# Patient Record
Sex: MALE | Race: WHITE | NOT HISPANIC OR LATINO | Employment: FULL TIME | URBAN - NONMETROPOLITAN AREA
[De-identification: names, ages, dates, MRNs, and addresses within clinical notes are randomized per-mention and may not be internally consistent; named-entity substitution may affect disease eponyms.]

---

## 2018-04-20 ENCOUNTER — OFFICE VISIT (OUTPATIENT)
Dept: INTERNAL MEDICINE | Facility: CLINIC | Age: 43
End: 2018-04-20

## 2018-04-20 VITALS
OXYGEN SATURATION: 98 % | HEART RATE: 74 BPM | BODY MASS INDEX: 30.09 KG/M2 | SYSTOLIC BLOOD PRESSURE: 130 MMHG | TEMPERATURE: 98.6 F | WEIGHT: 227 LBS | HEIGHT: 73 IN | DIASTOLIC BLOOD PRESSURE: 88 MMHG

## 2018-04-20 DIAGNOSIS — J32.8 OTHER CHRONIC SINUSITIS: ICD-10-CM

## 2018-04-20 DIAGNOSIS — F39 MOOD DISORDER (HCC): Primary | ICD-10-CM

## 2018-04-20 DIAGNOSIS — Z00.00 HEALTHCARE MAINTENANCE: ICD-10-CM

## 2018-04-20 DIAGNOSIS — R53.83 OTHER FATIGUE: ICD-10-CM

## 2018-04-20 DIAGNOSIS — E78.2 MIXED HYPERLIPIDEMIA: ICD-10-CM

## 2018-04-20 PROCEDURE — 99386 PREV VISIT NEW AGE 40-64: CPT | Performed by: FAMILY MEDICINE

## 2018-04-20 RX ORDER — MOMETASONE FUROATE 50 UG/1
2 SPRAY, METERED NASAL DAILY
COMMUNITY
End: 2019-06-03 | Stop reason: ALTCHOICE

## 2018-04-20 RX ORDER — IPRATROPIUM BROMIDE 21 UG/1
2 SPRAY, METERED NASAL EVERY 12 HOURS
Qty: 30 ML | Refills: 12 | Status: SHIPPED | OUTPATIENT
Start: 2018-04-20 | End: 2019-06-03 | Stop reason: ALTCHOICE

## 2018-04-20 RX ORDER — DULOXETIN HYDROCHLORIDE 60 MG/1
CAPSULE, DELAYED RELEASE ORAL
COMMUNITY
Start: 2018-03-16 | End: 2018-04-20 | Stop reason: SDUPTHER

## 2018-04-20 RX ORDER — DULOXETIN HYDROCHLORIDE 60 MG/1
60 CAPSULE, DELAYED RELEASE ORAL DAILY
Qty: 90 CAPSULE | Refills: 1 | Status: SHIPPED | OUTPATIENT
Start: 2018-04-20 | End: 2019-06-03 | Stop reason: SDUPTHER

## 2018-04-24 LAB
25(OH)D3+25(OH)D2 SERPL-MCNC: <12.8 NG/ML
ALBUMIN SERPL-MCNC: 4.2 G/DL (ref 3.5–5)
ALBUMIN/GLOB SERPL: 1.4 G/DL (ref 1–2)
ALP SERPL-CCNC: 105 U/L (ref 38–126)
ALT SERPL-CCNC: 23 U/L (ref 13–69)
AST SERPL-CCNC: 22 U/L (ref 15–46)
BILIRUB SERPL-MCNC: 1.2 MG/DL (ref 0.2–1.3)
BUN SERPL-MCNC: 14 MG/DL (ref 7–20)
BUN/CREAT SERPL: 15.6 (ref 6.3–21.9)
CALCIUM SERPL-MCNC: 9.5 MG/DL (ref 8.4–10.2)
CHLORIDE SERPL-SCNC: 103 MMOL/L (ref 98–107)
CHOLEST SERPL-MCNC: 248 MG/DL (ref 0–199)
CO2 SERPL-SCNC: 29 MMOL/L (ref 26–30)
CREAT SERPL-MCNC: 0.9 MG/DL (ref 0.6–1.3)
CRP SERPL HS-MCNC: 2.39 MG/L (ref 0–3)
ERYTHROCYTE [DISTWIDTH] IN BLOOD BY AUTOMATED COUNT: 12.4 % (ref 11.5–14.5)
FERRITIN SERPL-MCNC: 72.7 NG/ML (ref 17.9–464)
GFR SERPLBLD CREATININE-BSD FMLA CKD-EPI: 112 ML/MIN/1.73
GFR SERPLBLD CREATININE-BSD FMLA CKD-EPI: 93 ML/MIN/1.73
GLOBULIN SER CALC-MCNC: 3 GM/DL
GLUCOSE SERPL-MCNC: 104 MG/DL (ref 74–98)
HBA1C MFR BLD: 5.4 %
HCT VFR BLD AUTO: 44.5 % (ref 42–52)
HDLC SERPL-MCNC: 45 MG/DL (ref 40–60)
HGB BLD-MCNC: 15 G/DL (ref 14–18)
IRON SATN MFR SERPL: 47 % (ref 11–46)
IRON SERPL-MCNC: 167 MCG/DL (ref 37–181)
LDLC SERPL CALC-MCNC: 187 MG/DL (ref 0–99)
MCH RBC QN AUTO: 31 PG (ref 27–31)
MCHC RBC AUTO-ENTMCNC: 33.7 G/DL (ref 30–37)
MCV RBC AUTO: 91.9 FL (ref 80–94)
PLATELET # BLD AUTO: 232 10*3/MM3 (ref 130–400)
POTASSIUM SERPL-SCNC: 4.6 MMOL/L (ref 3.5–5.1)
PROT SERPL-MCNC: 7.2 G/DL (ref 6.3–8.2)
RBC # BLD AUTO: 4.84 10*6/MM3 (ref 4.7–6.1)
SODIUM SERPL-SCNC: 144 MMOL/L (ref 137–145)
T4 SERPL-MCNC: 5.6 UG/DL (ref 4.5–12)
TIBC SERPL-MCNC: 358 MCG/DL (ref 261–497)
TRIGL SERPL-MCNC: 80 MG/DL
TSH SERPL DL<=0.005 MIU/L-ACNC: 0.89 MIU/ML (ref 0.47–4.68)
UIBC SERPL-MCNC: 191 MCG/DL
VIT B12 SERPL-MCNC: 267 PG/ML (ref 239–931)
VLDLC SERPL CALC-MCNC: 16 MG/DL
WBC # BLD AUTO: 4.7 10*3/MM3 (ref 4.8–10.8)

## 2018-04-25 ENCOUNTER — APPOINTMENT (OUTPATIENT)
Dept: GENERAL RADIOLOGY | Facility: HOSPITAL | Age: 43
End: 2018-04-25

## 2018-04-25 ENCOUNTER — HOSPITAL ENCOUNTER (EMERGENCY)
Facility: HOSPITAL | Age: 43
Discharge: HOME | End: 2018-04-25
Attending: EMERGENCY MEDICINE | Admitting: EMERGENCY MEDICINE

## 2018-04-25 VITALS
HEART RATE: 62 BPM | RESPIRATION RATE: 15 BRPM | BODY MASS INDEX: 27.83 KG/M2 | TEMPERATURE: 98.6 F | OXYGEN SATURATION: 98 % | DIASTOLIC BLOOD PRESSURE: 99 MMHG | WEIGHT: 210 LBS | HEIGHT: 73 IN | SYSTOLIC BLOOD PRESSURE: 145 MMHG

## 2018-04-25 DIAGNOSIS — R07.9 CHEST PAIN, UNSPECIFIED TYPE: Primary | ICD-10-CM

## 2018-04-25 LAB
ALBUMIN SERPL-MCNC: 4.5 G/DL (ref 3.2–4.8)
ALBUMIN/GLOB SERPL: 1.8 G/DL (ref 1.5–2.5)
ALP SERPL-CCNC: 110 U/L (ref 25–100)
ALT SERPL W P-5'-P-CCNC: 17 U/L (ref 7–40)
ANION GAP SERPL CALCULATED.3IONS-SCNC: 7 MMOL/L (ref 3–11)
AST SERPL-CCNC: 22 U/L (ref 0–33)
BASOPHILS # BLD AUTO: 0.02 10*3/MM3 (ref 0–0.2)
BASOPHILS NFR BLD AUTO: 0.3 % (ref 0–1)
BILIRUB SERPL-MCNC: 0.6 MG/DL (ref 0.3–1.2)
BUN BLD-MCNC: 13 MG/DL (ref 9–23)
BUN/CREAT SERPL: 10.8 (ref 7–25)
CALCIUM SPEC-SCNC: 9.6 MG/DL (ref 8.7–10.4)
CHLORIDE SERPL-SCNC: 102 MMOL/L (ref 99–109)
CO2 SERPL-SCNC: 29 MMOL/L (ref 20–31)
CREAT BLD-MCNC: 1.2 MG/DL (ref 0.6–1.3)
DEPRECATED RDW RBC AUTO: 40.3 FL (ref 37–54)
EOSINOPHIL # BLD AUTO: 0.2 10*3/MM3 (ref 0–0.3)
EOSINOPHIL NFR BLD AUTO: 3.2 % (ref 0–3)
ERYTHROCYTE [DISTWIDTH] IN BLOOD BY AUTOMATED COUNT: 12.3 % (ref 11.3–14.5)
GFR SERPL CREATININE-BSD FRML MDRD: 66 ML/MIN/1.73
GLOBULIN UR ELPH-MCNC: 2.5 GM/DL
GLUCOSE BLD-MCNC: 87 MG/DL (ref 70–100)
HCT VFR BLD AUTO: 44.2 % (ref 38.9–50.9)
HGB BLD-MCNC: 15.2 G/DL (ref 13.1–17.5)
IMM GRANULOCYTES # BLD: 0.01 10*3/MM3 (ref 0–0.03)
IMM GRANULOCYTES NFR BLD: 0.2 % (ref 0–0.6)
LIPASE SERPL-CCNC: 32 U/L (ref 6–51)
LYMPHOCYTES # BLD AUTO: 1.79 10*3/MM3 (ref 0.6–4.8)
LYMPHOCYTES NFR BLD AUTO: 28.9 % (ref 24–44)
MCH RBC QN AUTO: 30.8 PG (ref 27–31)
MCHC RBC AUTO-ENTMCNC: 34.4 G/DL (ref 32–36)
MCV RBC AUTO: 89.5 FL (ref 80–99)
MONOCYTES # BLD AUTO: 0.58 10*3/MM3 (ref 0–1)
MONOCYTES NFR BLD AUTO: 9.4 % (ref 0–12)
NEUTROPHILS # BLD AUTO: 3.6 10*3/MM3 (ref 1.5–8.3)
NEUTROPHILS NFR BLD AUTO: 58 % (ref 41–71)
PLATELET # BLD AUTO: 230 10*3/MM3 (ref 150–450)
PMV BLD AUTO: 8.6 FL (ref 6–12)
POTASSIUM BLD-SCNC: 4.4 MMOL/L (ref 3.5–5.5)
PROT SERPL-MCNC: 7 G/DL (ref 5.7–8.2)
RBC # BLD AUTO: 4.94 10*6/MM3 (ref 4.2–5.76)
SODIUM BLD-SCNC: 138 MMOL/L (ref 132–146)
TESTOST FREE SERPL-MCNC: 8 PG/ML (ref 6.8–21.5)
TESTOST SERPL-MCNC: 457 NG/DL (ref 264–916)
TROPONIN I SERPL-MCNC: 0 NG/ML (ref 0–0.07)
TROPONIN I SERPL-MCNC: 0 NG/ML (ref 0–0.07)
WBC NRBC COR # BLD: 6.2 10*3/MM3 (ref 3.5–10.8)

## 2018-04-25 PROCEDURE — 93005 ELECTROCARDIOGRAM TRACING: CPT | Performed by: EMERGENCY MEDICINE

## 2018-04-25 PROCEDURE — 84484 ASSAY OF TROPONIN QUANT: CPT

## 2018-04-25 PROCEDURE — 80053 COMPREHEN METABOLIC PANEL: CPT | Performed by: EMERGENCY MEDICINE

## 2018-04-25 PROCEDURE — 71046 X-RAY EXAM CHEST 2 VIEWS: CPT

## 2018-04-25 PROCEDURE — 93005 ELECTROCARDIOGRAM TRACING: CPT

## 2018-04-25 PROCEDURE — 83690 ASSAY OF LIPASE: CPT | Performed by: EMERGENCY MEDICINE

## 2018-04-25 PROCEDURE — 85025 COMPLETE CBC W/AUTO DIFF WBC: CPT | Performed by: EMERGENCY MEDICINE

## 2018-04-25 PROCEDURE — 99284 EMERGENCY DEPT VISIT MOD MDM: CPT

## 2018-04-25 RX ORDER — ASPIRIN 325 MG
325 TABLET ORAL ONCE
Status: DISCONTINUED | OUTPATIENT
Start: 2018-04-25 | End: 2018-04-25

## 2019-06-03 ENCOUNTER — OFFICE VISIT (OUTPATIENT)
Dept: INTERNAL MEDICINE | Facility: CLINIC | Age: 44
End: 2019-06-03

## 2019-06-03 VITALS
HEIGHT: 73 IN | HEART RATE: 72 BPM | SYSTOLIC BLOOD PRESSURE: 122 MMHG | BODY MASS INDEX: 30.75 KG/M2 | OXYGEN SATURATION: 95 % | WEIGHT: 232 LBS | TEMPERATURE: 98 F | DIASTOLIC BLOOD PRESSURE: 90 MMHG

## 2019-06-03 DIAGNOSIS — M25.512 CHRONIC LEFT SHOULDER PAIN: Primary | ICD-10-CM

## 2019-06-03 DIAGNOSIS — M25.50 ARTHRALGIA, UNSPECIFIED JOINT: ICD-10-CM

## 2019-06-03 DIAGNOSIS — G89.29 CHRONIC LEFT SHOULDER PAIN: Primary | ICD-10-CM

## 2019-06-03 DIAGNOSIS — K21.9 GASTROESOPHAGEAL REFLUX DISEASE, ESOPHAGITIS PRESENCE NOT SPECIFIED: ICD-10-CM

## 2019-06-03 DIAGNOSIS — E78.00 PURE HYPERCHOLESTEROLEMIA: ICD-10-CM

## 2019-06-03 PROCEDURE — 99214 OFFICE O/P EST MOD 30 MIN: CPT | Performed by: FAMILY MEDICINE

## 2019-06-03 RX ORDER — DULOXETIN HYDROCHLORIDE 60 MG/1
60 CAPSULE, DELAYED RELEASE ORAL DAILY
Qty: 90 CAPSULE | Refills: 3 | Status: SHIPPED | OUTPATIENT
Start: 2019-06-03 | End: 2020-01-28 | Stop reason: SDUPTHER

## 2019-06-03 RX ORDER — OMEPRAZOLE 20 MG/1
20 CAPSULE, DELAYED RELEASE ORAL DAILY
Qty: 90 CAPSULE | Refills: 3 | Status: SHIPPED | OUTPATIENT
Start: 2019-06-03 | End: 2020-02-28 | Stop reason: SDUPTHER

## 2019-06-03 RX ORDER — CELECOXIB 100 MG/1
100 CAPSULE ORAL 2 TIMES DAILY PRN
Qty: 60 CAPSULE | Refills: 2 | Status: SHIPPED | OUTPATIENT
Start: 2019-06-03 | End: 2020-02-28

## 2019-06-03 RX ORDER — FLUTICASONE PROPIONATE 50 MCG
2 SPRAY, SUSPENSION (ML) NASAL DAILY
COMMUNITY
End: 2021-08-19 | Stop reason: SINTOL

## 2019-06-03 RX ORDER — ATORVASTATIN CALCIUM 20 MG/1
20 TABLET, FILM COATED ORAL DAILY
COMMUNITY
End: 2020-02-28

## 2019-06-03 RX ORDER — CETIRIZINE HYDROCHLORIDE, PSEUDOEPHEDRINE HYDROCHLORIDE 5; 120 MG/1; MG/1
1 TABLET, FILM COATED, EXTENDED RELEASE ORAL 2 TIMES DAILY
COMMUNITY
End: 2021-02-26

## 2019-06-03 NOTE — PROGRESS NOTES
Satish Funes is a 43 y.o. male.    Chief Complaint   Patient presents with   • Shoulder Pain     left shoulder x 6 months   • Hyperlipidemia   • Med Refill       HPI   Patient has had hyperlipidemia for few years. He has not been compliant with low fat diet. He has not been compliant with taking the medications, with side effects. his weight is stable compared to last visit. He is active.    Patient reports he has been taking cymbalta for body pain.  He states that the medication does help with his generalized body aches.  He is an active farmer and .  This tends to cause a lot of aches and pains.  He states that he is not taking the medication for anxiety or depression.    Patient has been complaining of shoulder - left pain for 6 months. Pain is a 4 on a scale of 0-10. Pain is ache. Pain radiates to arm - left. Pain is worse with lifting, better with nothing. Symptoms are worse.  Patient has tried nothing with no relief.  He has tried stretches with minimal relief.      Patient does admit to heartburn or reflux.  He is not currently taking anything for this issue.  He denies any nausea, vomiting, diarrhea, abdominal pain.    The following portions of the patient's history were reviewed and updated as appropriate: allergies, current medications, past family history, past medical history, past social history, past surgical history and problem list.     No Known Allergies      Current Outpatient Medications:   •  atorvastatin (LIPITOR) 20 MG tablet, Take 20 mg by mouth Daily., Disp: , Rfl:   •  cetirizine-pseudoephedrine (ZyrTEC-D) 5-120 MG per 12 hr tablet, Take 1 tablet by mouth 2 (Two) Times a Day., Disp: , Rfl:   •  DULoxetine (CYMBALTA) 60 MG capsule, Take 1 capsule by mouth Daily., Disp: 90 capsule, Rfl: 3  •  fluticasone (FLONASE) 50 MCG/ACT nasal spray, 2 sprays into the nostril(s) as directed by provider Daily., Disp: , Rfl:   •  celecoxib (CeleBREX) 100 MG capsule, Take 1 capsule by mouth 2 (Two)  "Times a Day As Needed for Moderate Pain ., Disp: 60 capsule, Rfl: 2  •  omeprazole (priLOSEC) 20 MG capsule, Take 1 capsule by mouth Daily., Disp: 90 capsule, Rfl: 3    ROS    Review of Systems   Constitutional: Negative for chills, fatigue and fever.   HENT: Positive for congestion and rhinorrhea. Negative for postnasal drip and sore throat.    Eyes: Negative for blurred vision and visual disturbance.   Respiratory: Negative for cough, shortness of breath and wheezing.    Cardiovascular: Negative for chest pain and leg swelling.   Gastrointestinal: Positive for GERD. Negative for abdominal pain, constipation, diarrhea, nausea and vomiting.   Musculoskeletal: Negative for arthralgias and back pain.   Skin: Negative for color change and rash.   Allergic/Immunologic: Negative for environmental allergies.   Neurological: Negative for weakness, numbness and headache.   Hematological: Does not bruise/bleed easily.   Psychiatric/Behavioral: Negative for depressed mood. The patient is not nervous/anxious.        Vitals:    06/03/19 1545   BP: 122/90   BP Location: Left arm   Patient Position: Sitting   Cuff Size: Adult   Pulse: 72   Temp: 98 °F (36.7 °C)   TempSrc: Oral   SpO2: 95%   Weight: 105 kg (232 lb)   Height: 185.4 cm (73\")     Body mass index is 30.61 kg/m².    Physical Exam     Physical Exam   Constitutional: He is oriented to person, place, and time. He appears well-developed and well-nourished. No distress.   HENT:   Head: Normocephalic and atraumatic.   Right Ear: Tympanic membrane and external ear normal.   Left Ear: Tympanic membrane and external ear normal.   Mouth/Throat: Oropharynx is clear and moist.   Eyes: Conjunctivae and EOM are normal. Pupils are equal, round, and reactive to light.   Neck: Normal range of motion. Neck supple.   Cardiovascular: Normal rate and regular rhythm.   No murmur heard.  Pulmonary/Chest: Effort normal and breath sounds normal. No respiratory distress. He has no wheezes. "   Abdominal: Soft. Bowel sounds are normal. He exhibits no distension. There is no tenderness.   Musculoskeletal: He exhibits no edema.        Left shoulder: He exhibits decreased range of motion and pain. He exhibits no tenderness and no swelling.   Lymphadenopathy:     He has no cervical adenopathy.   Neurological: He is alert and oriented to person, place, and time. No cranial nerve deficit.   Skin: Skin is warm and dry.   Psychiatric: He has a normal mood and affect.       Assessment/Plan    Problem List Items Addressed This Visit        Cardiovascular and Mediastinum    Hyperlipidemia     Likely uncontrolled due to noncompliance with medication.  Will obtain lipid level today.  If remains uncontrolled, patient has been highly encouraged to take a statin.  She is also been encouraged to follow a low-fat diet.         Relevant Medications    atorvastatin (LIPITOR) 20 MG tablet    Other Relevant Orders    CBC & Differential    Comprehensive Metabolic Panel    Lipid Panel       Digestive    Gastroesophageal reflux disease     Uncontrolled.  Will start the patient on Prilosec.         Relevant Medications    omeprazole (priLOSEC) 20 MG capsule       Nervous and Auditory    Chronic left shoulder pain - Primary     Likely arthritis.  Patient was previously taking Celebrex for arthritic type pains.  He would like to try this medication again.  He has been encouraged to stretch and exercise the shoulder as well.         Relevant Orders    XR Shoulder 2+ View Left    Joint pain     Improved with Cymbalta.  Patient may continue taking the medication.               New Medications Ordered This Visit   Medications   • celecoxib (CeleBREX) 100 MG capsule     Sig: Take 1 capsule by mouth 2 (Two) Times a Day As Needed for Moderate Pain .     Dispense:  60 capsule     Refill:  2   • DULoxetine (CYMBALTA) 60 MG capsule     Sig: Take 1 capsule by mouth Daily.     Dispense:  90 capsule     Refill:  3   • omeprazole (priLOSEC) 20  MG capsule     Sig: Take 1 capsule by mouth Daily.     Dispense:  90 capsule     Refill:  3       No orders of the defined types were placed in this encounter.      Return in about 3 months (around 9/3/2019) for shoulder pain, hyperlipidemi, GERD.    Yovana Sorensen, DO

## 2019-06-05 PROBLEM — M25.512 CHRONIC LEFT SHOULDER PAIN: Status: ACTIVE | Noted: 2019-06-05

## 2019-06-05 PROBLEM — K21.9 GASTROESOPHAGEAL REFLUX DISEASE: Status: ACTIVE | Noted: 2019-06-05

## 2019-06-05 PROBLEM — M25.50 JOINT PAIN: Status: ACTIVE | Noted: 2019-06-05

## 2019-06-05 PROBLEM — G89.29 CHRONIC LEFT SHOULDER PAIN: Status: ACTIVE | Noted: 2019-06-05

## 2019-06-05 NOTE — ASSESSMENT & PLAN NOTE
Likely uncontrolled due to noncompliance with medication.  Will obtain lipid level today.  If remains uncontrolled, patient has been highly encouraged to take a statin.  She is also been encouraged to follow a low-fat diet.

## 2019-06-05 NOTE — ASSESSMENT & PLAN NOTE
Likely arthritis.  Patient was previously taking Celebrex for arthritic type pains.  He would like to try this medication again.  He has been encouraged to stretch and exercise the shoulder as well.

## 2019-06-19 ENCOUNTER — TELEPHONE (OUTPATIENT)
Dept: INTERNAL MEDICINE | Facility: CLINIC | Age: 44
End: 2019-06-19

## 2019-06-19 ENCOUNTER — PRIOR AUTHORIZATION (OUTPATIENT)
Dept: INTERNAL MEDICINE | Facility: CLINIC | Age: 44
End: 2019-06-19

## 2020-01-28 ENCOUNTER — TELEPHONE (OUTPATIENT)
Dept: INTERNAL MEDICINE | Facility: CLINIC | Age: 45
End: 2020-01-28

## 2020-01-28 RX ORDER — DULOXETIN HYDROCHLORIDE 60 MG/1
60 CAPSULE, DELAYED RELEASE ORAL DAILY
Qty: 90 CAPSULE | Refills: 3 | Status: SHIPPED | OUTPATIENT
Start: 2020-01-28 | End: 2021-03-24

## 2020-01-28 NOTE — TELEPHONE ENCOUNTER
Patient requesting a refill on: DULoxetine (CYMBALTA) 60 MG capsule    Patient stated he is out of medication and would like a refill to last at least until his appt on 2/4/20. He mentioned his mother is in the hospital so he did not want to schedule this week.     Patient requesting that the prescription be sent to [id number] - [place], [place] - [address]     Patient’s call back number is: [phone]

## 2020-01-28 NOTE — TELEPHONE ENCOUNTER
Patient called to get a status update on this message. He was informed his PCP in clinic and they will get back to him within 72 hours or sooner if possible. He would like a return call to confirm if his request can be approved. Thank you.    Patient: [phone]

## 2020-02-28 ENCOUNTER — OFFICE VISIT (OUTPATIENT)
Dept: INTERNAL MEDICINE | Facility: CLINIC | Age: 45
End: 2020-02-28

## 2020-02-28 VITALS
BODY MASS INDEX: 30.42 KG/M2 | HEIGHT: 73 IN | HEART RATE: 66 BPM | TEMPERATURE: 97.9 F | RESPIRATION RATE: 18 BRPM | SYSTOLIC BLOOD PRESSURE: 130 MMHG | DIASTOLIC BLOOD PRESSURE: 77 MMHG | OXYGEN SATURATION: 97 % | WEIGHT: 229.5 LBS

## 2020-02-28 DIAGNOSIS — Z23 NEED FOR TDAP VACCINATION: ICD-10-CM

## 2020-02-28 DIAGNOSIS — E78.2 MIXED HYPERLIPIDEMIA: Primary | ICD-10-CM

## 2020-02-28 DIAGNOSIS — G89.29 CHRONIC LEFT SHOULDER PAIN: ICD-10-CM

## 2020-02-28 DIAGNOSIS — M25.512 CHRONIC LEFT SHOULDER PAIN: ICD-10-CM

## 2020-02-28 DIAGNOSIS — R53.83 FATIGUE, UNSPECIFIED TYPE: ICD-10-CM

## 2020-02-28 DIAGNOSIS — K21.9 GASTROESOPHAGEAL REFLUX DISEASE, ESOPHAGITIS PRESENCE NOT SPECIFIED: ICD-10-CM

## 2020-02-28 PROCEDURE — 99214 OFFICE O/P EST MOD 30 MIN: CPT | Performed by: FAMILY MEDICINE

## 2020-02-28 PROCEDURE — 90715 TDAP VACCINE 7 YRS/> IM: CPT | Performed by: FAMILY MEDICINE

## 2020-02-28 PROCEDURE — 90471 IMMUNIZATION ADMIN: CPT | Performed by: FAMILY MEDICINE

## 2020-02-28 RX ORDER — OMEPRAZOLE 20 MG/1
20 CAPSULE, DELAYED RELEASE ORAL DAILY
Qty: 90 CAPSULE | Refills: 3 | Status: SHIPPED | OUTPATIENT
Start: 2020-02-28 | End: 2021-02-26

## 2020-02-28 RX ORDER — DICLOFENAC SODIUM 75 MG/1
75 TABLET, DELAYED RELEASE ORAL 2 TIMES DAILY
Qty: 60 TABLET | Refills: 2 | Status: SHIPPED | OUTPATIENT
Start: 2020-02-28 | End: 2020-09-20

## 2020-02-28 NOTE — ASSESSMENT & PLAN NOTE
Likely uncontrolled secondary to noncompliance with Lipitor.  Patient has been encouraged to have laboratory studies done that were previously ordered.

## 2020-02-28 NOTE — PROGRESS NOTES
Satish Funes is a 44 y.o. male.    Chief Complaint   Patient presents with   • Shoulder Pain     Pt states the Celebrex nor the Cymbalta are helping with his shoulder pain. He feels his shoulder has gotten weaker. he does note that he does not take the Celebrex everyday.    • Heartburn     Omeprazole helps with the heartburn.        HPI   Patient has had hyperlipidemia for few years. He has not been compliant with low fat diet. He has not been compliant with taking the medications. his weight is stable compared to last visit. He is active.    Patient continues to have left shoulder pain and to his left pectoral region.  He reports that Celebrex did not seem to help with the pain.  Pain is worse with abduction and flexion at the shoulder.  He has not had imaging that was ordered at his last office visit.    Patient does complain of significant fatigue.  He reports that he does work long hours, which may be contributing.  However, he reports fatigue even if he is well rested.  He does have a history of testosterone deficiency and was previously treated with testosterone injections.    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  He is agreeable to having a Tdap today.    Patient does say Prilosec for GERD.  He reports the medication does control his reflux and heartburn.  Denies much symptoms.      The following portions of the patient's history were reviewed and updated as appropriate: allergies, current medications, past family history, past medical history, past social history, past surgical history and problem list.     No Known Allergies      Current Outpatient Medications:   •  cetirizine-pseudoephedrine (ZyrTEC-D) 5-120 MG per 12 hr tablet, Take 1 tablet by mouth 2 (Two) Times a Day., Disp: , Rfl:   •  DULoxetine (CYMBALTA) 60 MG capsule, Take 1 capsule by mouth Daily., Disp: 90 capsule, Rfl: 3  •  fluticasone (FLONASE) 50 MCG/ACT nasal spray, 2 sprays into the nostril(s) as  "directed by provider Daily., Disp: , Rfl:   •  omeprazole (priLOSEC) 20 MG capsule, Take 1 capsule by mouth Daily., Disp: 90 capsule, Rfl: 3  •  diclofenac (VOLTAREN) 75 MG EC tablet, Take 1 tablet by mouth 2 (Two) Times a Day., Disp: 60 tablet, Rfl: 2    ROS    Review of Systems   Constitutional: Positive for fatigue. Negative for chills and fever.   HENT: Negative for congestion, postnasal drip and sore throat.    Respiratory: Negative for cough, shortness of breath and wheezing.    Cardiovascular: Negative for chest pain and leg swelling.   Gastrointestinal: Negative for abdominal pain, blood in stool, constipation, diarrhea, nausea and vomiting.   Musculoskeletal: Positive for arthralgias (Left shoulder pain). Negative for back pain.   Skin: Negative for color change and rash.   Allergic/Immunologic: Negative for environmental allergies.   Neurological: Negative for weakness, numbness and headache.   Hematological: Does not bruise/bleed easily.   Psychiatric/Behavioral: Negative for depressed mood. The patient is not nervous/anxious.        Vitals:    02/28/20 1547   BP: 130/77   Pulse: 66   Resp: 18   Temp: 97.9 °F (36.6 °C)   TempSrc: Temporal   SpO2: 97%   Weight: 104 kg (229 lb 8 oz)   Height: 185.4 cm (72.99\")     Body mass index is 30.29 kg/m².    Physical Exam     Physical Exam   Constitutional: He is oriented to person, place, and time. He appears well-developed and well-nourished. No distress.   HENT:   Head: Normocephalic and atraumatic.   Right Ear: External ear normal.   Left Ear: External ear normal.   Mouth/Throat: Oropharynx is clear and moist.   Eyes: Pupils are equal, round, and reactive to light. Conjunctivae and EOM are normal.   Neck: Normal range of motion. Neck supple.   Cardiovascular: Normal rate and regular rhythm.   No murmur heard.  Pulmonary/Chest: Effort normal and breath sounds normal. No respiratory distress. He has no wheezes.   Abdominal: Soft. Bowel sounds are normal. He " exhibits no distension. There is no tenderness.   Musculoskeletal: Normal range of motion. He exhibits no edema.        Left shoulder: He exhibits pain (On adduction and flexion). He exhibits normal range of motion.   Lymphadenopathy:     He has no cervical adenopathy.   Neurological: He is alert and oriented to person, place, and time. No cranial nerve deficit.   Skin: Skin is warm and dry.   Psychiatric: He has a normal mood and affect.       Assessment/Plan    Problem List Items Addressed This Visit        Cardiovascular and Mediastinum    Hyperlipidemia - Primary     Likely uncontrolled secondary to noncompliance with Lipitor.  Patient has been encouraged to have laboratory studies done that were previously ordered.            Digestive    Gastroesophageal reflux disease     Controlled on current medication.  Patient is to continue Prilosec.         Relevant Medications    omeprazole (priLOSEC) 20 MG capsule       Nervous and Auditory    Chronic left shoulder pain     Patient encouraged to have x-ray done that was previously ordered.  Will try the patient on diclofenac for pain as needed.  If x-ray is unremarkable and pain persist with conservative therapy, the patient may need an MRI.           Other Visit Diagnoses     Fatigue, unspecified type        Relevant Orders    Testosterone, Free, Total    T4, Free    TSH    Need for Tdap vaccination        Relevant Orders    Tdap Vaccine Greater Than or Equal To 6yo IM (Completed)          New Medications Ordered This Visit   Medications   • omeprazole (priLOSEC) 20 MG capsule     Sig: Take 1 capsule by mouth Daily.     Dispense:  90 capsule     Refill:  3   • diclofenac (VOLTAREN) 75 MG EC tablet     Sig: Take 1 tablet by mouth 2 (Two) Times a Day.     Dispense:  60 tablet     Refill:  2       Orders Placed This Encounter   Procedures   • Tdap Vaccine Greater Than or Equal To 6yo IM       Return in about 6 months (around 8/28/2020) for GERD.    Yovana Sorensen,  DO

## 2020-02-28 NOTE — ASSESSMENT & PLAN NOTE
Patient encouraged to have x-ray done that was previously ordered.  Will try the patient on diclofenac for pain as needed.  If x-ray is unremarkable and pain persist with conservative therapy, the patient may need an MRI.

## 2020-03-04 ENCOUNTER — TELEPHONE (OUTPATIENT)
Dept: INTERNAL MEDICINE | Facility: CLINIC | Age: 45
End: 2020-03-04

## 2020-03-04 RX ORDER — AMOXICILLIN AND CLAVULANATE POTASSIUM 875; 125 MG/1; MG/1
1 TABLET, FILM COATED ORAL EVERY 12 HOURS SCHEDULED
Qty: 20 TABLET | Refills: 0 | Status: SHIPPED | OUTPATIENT
Start: 2020-03-04 | End: 2020-06-08

## 2020-03-07 LAB
ALBUMIN SERPL-MCNC: 4.6 G/DL (ref 3.5–5.2)
ALBUMIN/GLOB SERPL: 1.6 G/DL
ALP SERPL-CCNC: 134 U/L (ref 39–117)
ALT SERPL-CCNC: 27 U/L (ref 1–41)
AST SERPL-CCNC: 19 U/L (ref 1–40)
BASOPHILS # BLD AUTO: 0.06 10*3/MM3 (ref 0–0.2)
BASOPHILS NFR BLD AUTO: 1.1 % (ref 0–1.5)
BILIRUB SERPL-MCNC: 0.6 MG/DL (ref 0.2–1.2)
BUN SERPL-MCNC: 8 MG/DL (ref 6–20)
BUN/CREAT SERPL: 7.8 (ref 7–25)
CALCIUM SERPL-MCNC: 9.7 MG/DL (ref 8.6–10.5)
CHLORIDE SERPL-SCNC: 101 MMOL/L (ref 98–107)
CHOLEST SERPL-MCNC: 248 MG/DL (ref 0–200)
CO2 SERPL-SCNC: 26.6 MMOL/L (ref 22–29)
CREAT SERPL-MCNC: 1.03 MG/DL (ref 0.76–1.27)
EOSINOPHIL # BLD AUTO: 0.35 10*3/MM3 (ref 0–0.4)
EOSINOPHIL NFR BLD AUTO: 6.2 % (ref 0.3–6.2)
ERYTHROCYTE [DISTWIDTH] IN BLOOD BY AUTOMATED COUNT: 12.1 % (ref 12.3–15.4)
GLOBULIN SER CALC-MCNC: 2.8 GM/DL
GLUCOSE SERPL-MCNC: 114 MG/DL (ref 65–99)
HCT VFR BLD AUTO: 47.2 % (ref 37.5–51)
HDLC SERPL-MCNC: 45 MG/DL (ref 40–60)
HGB BLD-MCNC: 16.3 G/DL (ref 13–17.7)
IMM GRANULOCYTES # BLD AUTO: 0.02 10*3/MM3 (ref 0–0.05)
IMM GRANULOCYTES NFR BLD AUTO: 0.4 % (ref 0–0.5)
LDLC SERPL CALC-MCNC: 183 MG/DL (ref 0–100)
LYMPHOCYTES # BLD AUTO: 1.52 10*3/MM3 (ref 0.7–3.1)
LYMPHOCYTES NFR BLD AUTO: 27.1 % (ref 19.6–45.3)
MCH RBC QN AUTO: 31.1 PG (ref 26.6–33)
MCHC RBC AUTO-ENTMCNC: 34.5 G/DL (ref 31.5–35.7)
MCV RBC AUTO: 90.1 FL (ref 79–97)
MONOCYTES # BLD AUTO: 0.48 10*3/MM3 (ref 0.1–0.9)
MONOCYTES NFR BLD AUTO: 8.6 % (ref 5–12)
NEUTROPHILS # BLD AUTO: 3.18 10*3/MM3 (ref 1.7–7)
NEUTROPHILS NFR BLD AUTO: 56.6 % (ref 42.7–76)
NRBC BLD AUTO-RTO: 0 /100 WBC (ref 0–0.2)
PLATELET # BLD AUTO: 268 10*3/MM3 (ref 140–450)
POTASSIUM SERPL-SCNC: 5 MMOL/L (ref 3.5–5.2)
PROT SERPL-MCNC: 7.4 G/DL (ref 6–8.5)
RBC # BLD AUTO: 5.24 10*6/MM3 (ref 4.14–5.8)
SODIUM SERPL-SCNC: 140 MMOL/L (ref 136–145)
T4 FREE SERPL-MCNC: 1.1 NG/DL (ref 0.93–1.7)
TRIGL SERPL-MCNC: 102 MG/DL (ref 0–150)
TSH SERPL DL<=0.005 MIU/L-ACNC: 1.68 UIU/ML (ref 0.27–4.2)
VLDLC SERPL CALC-MCNC: 20.4 MG/DL
WBC # BLD AUTO: 5.61 10*3/MM3 (ref 3.4–10.8)

## 2020-03-08 LAB
TESTOST FREE SERPL-MCNC: 14 PG/ML (ref 6.8–21.5)
TESTOST SERPL-MCNC: 431 NG/DL (ref 264–916)

## 2020-03-24 ENCOUNTER — TELEPHONE (OUTPATIENT)
Dept: INTERNAL MEDICINE | Facility: CLINIC | Age: 45
End: 2020-03-24

## 2020-03-24 RX ORDER — ATORVASTATIN CALCIUM 20 MG/1
20 TABLET, FILM COATED ORAL DAILY
Qty: 90 TABLET | Refills: 1 | Status: SHIPPED | OUTPATIENT
Start: 2020-03-24 | End: 2021-08-19 | Stop reason: SDUPTHER

## 2020-03-24 NOTE — TELEPHONE ENCOUNTER
Pt had called back regarding his high cholesterol. He stated that he just stopped taking the Lipitor. So he would like to go back on it.

## 2020-03-24 NOTE — TELEPHONE ENCOUNTER
Patient stated that he has labs results several weeks ago and he wanted to see what these showed.  Please advise.  He can be reached at [phone]

## 2020-03-26 ENCOUNTER — TELEPHONE (OUTPATIENT)
Dept: INTERNAL MEDICINE | Facility: CLINIC | Age: 45
End: 2020-03-26

## 2020-03-27 RX ORDER — BENZONATATE 200 MG/1
200 CAPSULE ORAL 3 TIMES DAILY PRN
Qty: 30 CAPSULE | Refills: 0 | Status: SHIPPED | OUTPATIENT
Start: 2020-03-27 | End: 2020-06-08

## 2020-05-22 ENCOUNTER — TELEPHONE (OUTPATIENT)
Dept: INTERNAL MEDICINE | Facility: CLINIC | Age: 45
End: 2020-05-22

## 2020-06-08 ENCOUNTER — APPOINTMENT (OUTPATIENT)
Dept: GENERAL RADIOLOGY | Facility: HOSPITAL | Age: 45
End: 2020-06-08

## 2020-06-08 ENCOUNTER — HOSPITAL ENCOUNTER (OUTPATIENT)
Facility: HOSPITAL | Age: 45
Setting detail: OBSERVATION
Discharge: HOME | End: 2020-06-09
Attending: EMERGENCY MEDICINE | Admitting: FAMILY MEDICINE

## 2020-06-08 DIAGNOSIS — I20.0 UNSTABLE ANGINA (HCC): Primary | ICD-10-CM

## 2020-06-08 PROBLEM — R53.83 OTHER FATIGUE: Status: RESOLVED | Noted: 2018-04-20 | Resolved: 2020-06-08

## 2020-06-08 PROBLEM — M25.512 CHRONIC LEFT SHOULDER PAIN: Status: RESOLVED | Noted: 2019-06-05 | Resolved: 2020-06-08

## 2020-06-08 PROBLEM — F39 MOOD DISORDER (HCC): Status: RESOLVED | Noted: 2018-04-20 | Resolved: 2020-06-08

## 2020-06-08 PROBLEM — F32.A DEPRESSION: Status: ACTIVE | Noted: 2020-06-08

## 2020-06-08 PROBLEM — G89.29 CHRONIC LEFT SHOULDER PAIN: Status: RESOLVED | Noted: 2019-06-05 | Resolved: 2020-06-08

## 2020-06-08 PROBLEM — M25.50 JOINT PAIN: Status: RESOLVED | Noted: 2019-06-05 | Resolved: 2020-06-08

## 2020-06-08 LAB
ALBUMIN SERPL-MCNC: 4.2 G/DL (ref 3.5–5.2)
ALBUMIN/GLOB SERPL: 1.6 G/DL
ALP SERPL-CCNC: 119 U/L (ref 39–117)
ALT SERPL W P-5'-P-CCNC: 18 U/L (ref 1–41)
ANION GAP SERPL CALCULATED.3IONS-SCNC: 13 MMOL/L (ref 5–15)
AST SERPL-CCNC: 21 U/L (ref 1–40)
BASOPHILS # BLD AUTO: 0.05 10*3/MM3 (ref 0–0.2)
BASOPHILS NFR BLD AUTO: 0.8 % (ref 0–1.5)
BILIRUB SERPL-MCNC: 0.4 MG/DL (ref 0.2–1.2)
BUN BLD-MCNC: 10 MG/DL (ref 6–20)
BUN/CREAT SERPL: 12.7 (ref 7–25)
CALCIUM SPEC-SCNC: 8.9 MG/DL (ref 8.6–10.5)
CHLORIDE SERPL-SCNC: 102 MMOL/L (ref 98–107)
CO2 SERPL-SCNC: 25 MMOL/L (ref 22–29)
CREAT BLD-MCNC: 0.79 MG/DL (ref 0.76–1.27)
D DIMER PPP FEU-MCNC: 0.27 MCGFEU/ML (ref 0–0.56)
DEPRECATED RDW RBC AUTO: 40.4 FL (ref 37–54)
EOSINOPHIL # BLD AUTO: 0.22 10*3/MM3 (ref 0–0.4)
EOSINOPHIL NFR BLD AUTO: 3.7 % (ref 0.3–6.2)
ERYTHROCYTE [DISTWIDTH] IN BLOOD BY AUTOMATED COUNT: 12.1 % (ref 12.3–15.4)
GFR SERPL CREATININE-BSD FRML MDRD: 107 ML/MIN/1.73
GLOBULIN UR ELPH-MCNC: 2.7 GM/DL
GLUCOSE BLD-MCNC: 130 MG/DL (ref 65–99)
HCT VFR BLD AUTO: 41.8 % (ref 37.5–51)
HGB BLD-MCNC: 14.2 G/DL (ref 13–17.7)
HOLD SPECIMEN: NORMAL
HOLD SPECIMEN: NORMAL
IMM GRANULOCYTES # BLD AUTO: 0.02 10*3/MM3 (ref 0–0.05)
IMM GRANULOCYTES NFR BLD AUTO: 0.3 % (ref 0–0.5)
LIPASE SERPL-CCNC: 24 U/L (ref 13–60)
LYMPHOCYTES # BLD AUTO: 2.07 10*3/MM3 (ref 0.7–3.1)
LYMPHOCYTES NFR BLD AUTO: 35 % (ref 19.6–45.3)
MCH RBC QN AUTO: 31.1 PG (ref 26.6–33)
MCHC RBC AUTO-ENTMCNC: 34 G/DL (ref 31.5–35.7)
MCV RBC AUTO: 91.7 FL (ref 79–97)
MONOCYTES # BLD AUTO: 0.56 10*3/MM3 (ref 0.1–0.9)
MONOCYTES NFR BLD AUTO: 9.5 % (ref 5–12)
NEUTROPHILS # BLD AUTO: 3 10*3/MM3 (ref 1.7–7)
NEUTROPHILS NFR BLD AUTO: 50.7 % (ref 42.7–76)
NRBC BLD AUTO-RTO: 0 /100 WBC (ref 0–0.2)
NT-PROBNP SERPL-MCNC: 12.3 PG/ML (ref 5–450)
PLATELET # BLD AUTO: 235 10*3/MM3 (ref 140–450)
PMV BLD AUTO: 9.1 FL (ref 6–12)
POTASSIUM BLD-SCNC: 3.7 MMOL/L (ref 3.5–5.2)
PROT SERPL-MCNC: 6.9 G/DL (ref 6–8.5)
RBC # BLD AUTO: 4.56 10*6/MM3 (ref 4.14–5.8)
SODIUM BLD-SCNC: 140 MMOL/L (ref 136–145)
TROPONIN T SERPL-MCNC: <0.01 NG/ML (ref 0–0.03)
WBC NRBC COR # BLD: 5.92 10*3/MM3 (ref 3.4–10.8)
WHOLE BLOOD HOLD SPECIMEN: NORMAL
WHOLE BLOOD HOLD SPECIMEN: NORMAL

## 2020-06-08 PROCEDURE — 93005 ELECTROCARDIOGRAM TRACING: CPT

## 2020-06-08 PROCEDURE — 83690 ASSAY OF LIPASE: CPT | Performed by: EMERGENCY MEDICINE

## 2020-06-08 PROCEDURE — 93005 ELECTROCARDIOGRAM TRACING: CPT | Performed by: PHYSICIAN ASSISTANT

## 2020-06-08 PROCEDURE — 99285 EMERGENCY DEPT VISIT HI MDM: CPT

## 2020-06-08 PROCEDURE — G0378 HOSPITAL OBSERVATION PER HR: HCPCS

## 2020-06-08 PROCEDURE — 25010000002 ENOXAPARIN PER 10 MG: Performed by: PHYSICIAN ASSISTANT

## 2020-06-08 PROCEDURE — 71045 X-RAY EXAM CHEST 1 VIEW: CPT

## 2020-06-08 PROCEDURE — 96361 HYDRATE IV INFUSION ADD-ON: CPT

## 2020-06-08 PROCEDURE — 84484 ASSAY OF TROPONIN QUANT: CPT | Performed by: EMERGENCY MEDICINE

## 2020-06-08 PROCEDURE — 96372 THER/PROPH/DIAG INJ SC/IM: CPT

## 2020-06-08 PROCEDURE — 83880 ASSAY OF NATRIURETIC PEPTIDE: CPT | Performed by: EMERGENCY MEDICINE

## 2020-06-08 PROCEDURE — 85025 COMPLETE CBC W/AUTO DIFF WBC: CPT

## 2020-06-08 PROCEDURE — 80053 COMPREHEN METABOLIC PANEL: CPT | Performed by: EMERGENCY MEDICINE

## 2020-06-08 PROCEDURE — 84484 ASSAY OF TROPONIN QUANT: CPT | Performed by: NURSE PRACTITIONER

## 2020-06-08 PROCEDURE — 99220 PR INITIAL OBSERVATION CARE/DAY 70 MINUTES: CPT | Performed by: NURSE PRACTITIONER

## 2020-06-08 PROCEDURE — 85379 FIBRIN DEGRADATION QUANT: CPT | Performed by: PHYSICIAN ASSISTANT

## 2020-06-08 PROCEDURE — 96360 HYDRATION IV INFUSION INIT: CPT

## 2020-06-08 PROCEDURE — 93005 ELECTROCARDIOGRAM TRACING: CPT | Performed by: EMERGENCY MEDICINE

## 2020-06-08 PROCEDURE — 84484 ASSAY OF TROPONIN QUANT: CPT | Performed by: PHYSICIAN ASSISTANT

## 2020-06-08 RX ORDER — CHOLECALCIFEROL (VITAMIN D3) 125 MCG
5 CAPSULE ORAL NIGHTLY PRN
Status: DISCONTINUED | OUTPATIENT
Start: 2020-06-08 | End: 2020-06-09 | Stop reason: HOSPADM

## 2020-06-08 RX ORDER — SODIUM CHLORIDE 0.9 % (FLUSH) 0.9 %
10 SYRINGE (ML) INJECTION AS NEEDED
Status: DISCONTINUED | OUTPATIENT
Start: 2020-06-08 | End: 2020-06-09 | Stop reason: HOSPADM

## 2020-06-08 RX ORDER — ONDANSETRON 2 MG/ML
4 INJECTION INTRAMUSCULAR; INTRAVENOUS EVERY 6 HOURS PRN
Status: DISCONTINUED | OUTPATIENT
Start: 2020-06-08 | End: 2020-06-09 | Stop reason: HOSPADM

## 2020-06-08 RX ORDER — ASPIRIN 81 MG/1
324 TABLET, CHEWABLE ORAL ONCE
Status: COMPLETED | OUTPATIENT
Start: 2020-06-08 | End: 2020-06-08

## 2020-06-08 RX ORDER — PANTOPRAZOLE SODIUM 40 MG/1
40 TABLET, DELAYED RELEASE ORAL EVERY MORNING
Status: DISCONTINUED | OUTPATIENT
Start: 2020-06-09 | End: 2020-06-09 | Stop reason: HOSPADM

## 2020-06-08 RX ORDER — SODIUM CHLORIDE 0.9 % (FLUSH) 0.9 %
10 SYRINGE (ML) INJECTION EVERY 12 HOURS SCHEDULED
Status: DISCONTINUED | OUTPATIENT
Start: 2020-06-08 | End: 2020-06-09 | Stop reason: HOSPADM

## 2020-06-08 RX ORDER — FLUTICASONE PROPIONATE 50 MCG
2 SPRAY, SUSPENSION (ML) NASAL DAILY
Status: DISCONTINUED | OUTPATIENT
Start: 2020-06-09 | End: 2020-06-09 | Stop reason: HOSPADM

## 2020-06-08 RX ORDER — ACETAMINOPHEN 325 MG/1
650 TABLET ORAL EVERY 6 HOURS PRN
Status: DISCONTINUED | OUTPATIENT
Start: 2020-06-08 | End: 2020-06-09 | Stop reason: HOSPADM

## 2020-06-08 RX ORDER — ATORVASTATIN CALCIUM 20 MG/1
20 TABLET, FILM COATED ORAL DAILY
Status: DISCONTINUED | OUTPATIENT
Start: 2020-06-09 | End: 2020-06-09 | Stop reason: HOSPADM

## 2020-06-08 RX ORDER — DULOXETIN HYDROCHLORIDE 60 MG/1
60 CAPSULE, DELAYED RELEASE ORAL DAILY
Status: DISCONTINUED | OUTPATIENT
Start: 2020-06-09 | End: 2020-06-09 | Stop reason: HOSPADM

## 2020-06-08 RX ORDER — CLOPIDOGREL BISULFATE 75 MG/1
300 TABLET ORAL ONCE
Status: COMPLETED | OUTPATIENT
Start: 2020-06-08 | End: 2020-06-08

## 2020-06-08 RX ORDER — NITROGLYCERIN 0.4 MG/1
0.4 TABLET SUBLINGUAL
Status: DISCONTINUED | OUTPATIENT
Start: 2020-06-08 | End: 2020-06-09 | Stop reason: HOSPADM

## 2020-06-08 RX ORDER — SODIUM CHLORIDE 9 MG/ML
75 INJECTION, SOLUTION INTRAVENOUS ONCE
Status: COMPLETED | OUTPATIENT
Start: 2020-06-08 | End: 2020-06-08

## 2020-06-08 RX ORDER — ONDANSETRON 4 MG/1
4 TABLET, FILM COATED ORAL EVERY 6 HOURS PRN
Status: DISCONTINUED | OUTPATIENT
Start: 2020-06-08 | End: 2020-06-09 | Stop reason: HOSPADM

## 2020-06-08 RX ADMIN — ACETAMINOPHEN 650 MG: 325 TABLET, FILM COATED ORAL at 23:59

## 2020-06-08 RX ADMIN — ENOXAPARIN SODIUM 100 MG: 100 INJECTION SUBCUTANEOUS at 18:52

## 2020-06-08 RX ADMIN — ASPIRIN 81 MG 324 MG: 81 TABLET ORAL at 16:36

## 2020-06-08 RX ADMIN — SODIUM CHLORIDE, PRESERVATIVE FREE 10 ML: 5 INJECTION INTRAVENOUS at 22:11

## 2020-06-08 RX ADMIN — SODIUM CHLORIDE 75 ML/HR: 9 INJECTION, SOLUTION INTRAVENOUS at 22:11

## 2020-06-08 RX ADMIN — CLOPIDOGREL BISULFATE 300 MG: 75 TABLET ORAL at 18:51

## 2020-06-08 RX ADMIN — NITROGLYCERIN 1 INCH: 20 OINTMENT TOPICAL at 17:18

## 2020-06-08 NOTE — ED PROVIDER NOTES
Subjective   Pt is a 43 yo male presenting to ED with chest pain. Pt reports left sided chest pain for the past week that initially was intermittent but has now become constant over the past day. He describes the pain as a soreness that radiates into his left shoulder. He has had intermittent episodes of left arm tingling shooting down to left hand. He woke up in the middle of the night last night drenched in sweat. He denies prior known cardiac hx and had a normal stress test years ago in [place]. He also has felt fatigued but denies dizziness, CP, SOB, cough or fevers. He denies N/V/D, abdominal pain or leg swelling. He tried Tylenol without relief. He denies any known injury to cause the pain and no heavy lifting. Pt reports family hx of mother and father with cardiac problems starting in their 60's. PMHx significant for HTN, HLD, Reflux and Seasonal Allergies. He denies tobacco or drug use and occasionally drinks ETOH. Pt denies known exposure to Covid.       History provided by:  Medical records and patient  Chest Pain   Pain location:  L chest  Pain quality: aching    Pain quality comment:  Soreness  Pain radiates to:  L shoulder  Pain severity:  Moderate  Onset quality:  Gradual  Duration:  1 week  Timing:  Constant  Progression:  Worsening  Chronicity:  New  Context: at rest    Relieved by:  Nothing  Worsened by:  Nothing  Ineffective treatments:  Antacids and aspirin (Tylenol)  Associated symptoms: fatigue and numbness (Left arm)    Associated symptoms: no abdominal pain, no anxiety, no back pain, no cough, no diaphoresis, no dizziness, no dysphagia, no fever, no headache, no heartburn, no lower extremity edema, no nausea, no palpitations, no shortness of breath, no vomiting and no weakness    Risk factors: high cholesterol, hypertension and male sex    Risk factors: no coronary artery disease, no diabetes mellitus, no prior DVT/PE and no smoking        Review of Systems   Constitutional: Positive for  fatigue. Negative for diaphoresis and fever.   HENT: Negative for trouble swallowing.    Respiratory: Negative for cough and shortness of breath.    Cardiovascular: Positive for chest pain. Negative for palpitations and leg swelling.   Gastrointestinal: Negative for abdominal pain, heartburn, nausea and vomiting.   Genitourinary: Negative for difficulty urinating and flank pain.   Musculoskeletal: Positive for arthralgias (Left shoulder). Negative for back pain and neck pain.   Neurological: Positive for numbness (Left arm). Negative for dizziness, syncope, weakness and headaches.   All other systems reviewed and are negative.      Past Medical History:   Diagnosis Date   • Fractures    • Hyperlipidemia    • Hypertension    • Kidney stone        No Known Allergies    Past Surgical History:   Procedure Laterality Date   • ORIF WRIST FRACTURE Right 2010    plate and 7 pins placed       Family History   Problem Relation Age of Onset   • Arthritis Mother    • Diabetes Mother    • Hypertension Mother    • Hyperlipidemia Mother    • Obesity Mother    • Stroke Mother    • Heart disease Mother 53   • Heart attack Father 62   • Hypertension Father    • Liver disease Father    • COPD Father    • Heart attack Maternal Aunt    • Hypertension Maternal Aunt    • Hyperlipidemia Maternal Aunt    • Heart disease Maternal Aunt    • COPD Maternal Aunt    • No Known Problems Maternal Uncle    • No Known Problems Paternal Aunt    • No Known Problems Paternal Uncle    • Stroke Maternal Grandmother    • No Known Problems Paternal Grandmother    • Heart attack Paternal Grandfather    • Heart attack Maternal Aunt    • Heart attack Maternal Uncle    • Heart disease Maternal Uncle    • Hyperlipidemia Maternal Uncle    • Hypertension Maternal Uncle    • COPD Maternal Uncle    • No Known Problems Paternal Aunt    • Aneurysm Paternal Uncle        Social History     Socioeconomic History   • Marital status: Single     Spouse name: Not on file   •  Number of children: Not on file   • Years of education: Not on file   • Highest education level: Not on file   Tobacco Use   • Smoking status: Never Smoker   • Smokeless tobacco: Never Used   Substance and Sexual Activity   • Alcohol use: Yes     Alcohol/week: 3.0 standard drinks     Types: 3 Shots of liquor per week   • Drug use: No   • Sexual activity: Yes     Partners: Female           Objective   Physical Exam   Constitutional: He is oriented to person, place, and time. He appears well-developed and well-nourished.   HENT:   Head: Atraumatic.   Nose: Nose normal.   Eyes: Pupils are equal, round, and reactive to light. Conjunctivae, EOM and lids are normal.   Neck: Normal range of motion. Neck supple.   Cardiovascular: Normal rate, regular rhythm and normal heart sounds.   Pulmonary/Chest: Effort normal and breath sounds normal.   Abdominal: Soft. He exhibits no distension. There is no tenderness. There is no rebound and no guarding.   Musculoskeletal: Normal range of motion. He exhibits no edema, tenderness or deformity.   Neurological: He is alert and oriented to person, place, and time. He has normal strength. No sensory deficit.   Skin: Skin is warm, dry and intact.   Psychiatric: He has a normal mood and affect. His behavior is normal.   Nursing note and vitals reviewed.      Procedures           ED Course  ED Course as of Jun 08 2215 Mon Jun 08, 2020 1714 Troponin T: <0.010 [RS]   1714 proBNP: 12.3 [RS]   1814 D-Dimer, Quant: 0.27 [RS]   1820 Re-examined patient and discussed results. Pt has had improvement of his left sided chest pain with the nitropaste. Discussed results and plan for admission.     [RT]   1828 Discussed patient with Dr. Garcia. Worsening CP for the past week with left arm numbness and improvement in pain with Nitro. Will admit for unstable angina. Will give dose of Lovenox and Plavix in ED.     [RT]   1845 Discussed admission with hospitalist Dr. Mathew    [RT]      ED Course User  Index  [RS] Mann Garcia MD  [RT] Lisette Rodriguez PA      Recent Results (from the past 24 hour(s))   Troponin    Collection Time: 06/08/20  4:08 PM   Result Value Ref Range    Troponin T <0.010 0.000 - 0.030 ng/mL   Comprehensive Metabolic Panel    Collection Time: 06/08/20  4:08 PM   Result Value Ref Range    Glucose 130 (H) 65 - 99 mg/dL    BUN 10 6 - 20 mg/dL    Creatinine 0.79 0.76 - 1.27 mg/dL    Sodium 140 136 - 145 mmol/L    Potassium 3.7 3.5 - 5.2 mmol/L    Chloride 102 98 - 107 mmol/L    CO2 25.0 22.0 - 29.0 mmol/L    Calcium 8.9 8.6 - 10.5 mg/dL    Total Protein 6.9 6.0 - 8.5 g/dL    Albumin 4.20 3.50 - 5.20 g/dL    ALT (SGPT) 18 1 - 41 U/L    AST (SGOT) 21 1 - 40 U/L    Alkaline Phosphatase 119 (H) 39 - 117 U/L    Total Bilirubin 0.4 0.2 - 1.2 mg/dL    eGFR Non African Amer 107 >60 mL/min/1.73    Globulin 2.7 gm/dL    A/G Ratio 1.6 g/dL    BUN/Creatinine Ratio 12.7 7.0 - 25.0    Anion Gap 13.0 5.0 - 15.0 mmol/L   Lipase    Collection Time: 06/08/20  4:08 PM   Result Value Ref Range    Lipase 24 13 - 60 U/L   BNP    Collection Time: 06/08/20  4:08 PM   Result Value Ref Range    proBNP 12.3 5.0 - 450.0 pg/mL   Light Blue Top    Collection Time: 06/08/20  4:08 PM   Result Value Ref Range    Extra Tube hold for add-on    Green Top (Gel)    Collection Time: 06/08/20  4:08 PM   Result Value Ref Range    Extra Tube Hold for add-ons.    Lavender Top    Collection Time: 06/08/20  4:08 PM   Result Value Ref Range    Extra Tube hold for add-on    Gold Top - SST    Collection Time: 06/08/20  4:08 PM   Result Value Ref Range    Extra Tube Hold for add-ons.    CBC Auto Differential    Collection Time: 06/08/20  4:08 PM   Result Value Ref Range    WBC 5.92 3.40 - 10.80 10*3/mm3    RBC 4.56 4.14 - 5.80 10*6/mm3    Hemoglobin 14.2 13.0 - 17.7 g/dL    Hematocrit 41.8 37.5 - 51.0 %    MCV 91.7 79.0 - 97.0 fL    MCH 31.1 26.6 - 33.0 pg    MCHC 34.0 31.5 - 35.7 g/dL    RDW 12.1 (L) 12.3 - 15.4 %    RDW-SD 40.4 37.0  - 54.0 fl    MPV 9.1 6.0 - 12.0 fL    Platelets 235 140 - 450 10*3/mm3    Neutrophil % 50.7 42.7 - 76.0 %    Lymphocyte % 35.0 19.6 - 45.3 %    Monocyte % 9.5 5.0 - 12.0 %    Eosinophil % 3.7 0.3 - 6.2 %    Basophil % 0.8 0.0 - 1.5 %    Immature Grans % 0.3 0.0 - 0.5 %    Neutrophils, Absolute 3.00 1.70 - 7.00 10*3/mm3    Lymphocytes, Absolute 2.07 0.70 - 3.10 10*3/mm3    Monocytes, Absolute 0.56 0.10 - 0.90 10*3/mm3    Eosinophils, Absolute 0.22 0.00 - 0.40 10*3/mm3    Basophils, Absolute 0.05 0.00 - 0.20 10*3/mm3    Immature Grans, Absolute 0.02 0.00 - 0.05 10*3/mm3    nRBC 0.0 0.0 - 0.2 /100 WBC   D-dimer, Quantitative    Collection Time: 06/08/20  4:08 PM   Result Value Ref Range    D-Dimer, Quantitative 0.27 0.00 - 0.56 MCGFEU/mL   Troponin    Collection Time: 06/08/20  6:11 PM   Result Value Ref Range    Troponin T <0.010 0.000 - 0.030 ng/mL     Note: In addition to lab results from this visit, the labs listed above may include labs taken at another facility or during a different encounter within the last 24 hours. Please correlate lab times with ED admission and discharge times for further clarification of the services performed during this visit.    XR Chest 1 View   Preliminary Result   No acute cardiopulmonary process.       D:  06/08/2020   E:  06/08/2020                Vitals:    06/08/20 1700 06/08/20 1730 06/08/20 1800 06/08/20 1814   BP: 133/84 138/80 131/81    BP Location:       Patient Position:       Pulse: 78 75 70    Resp:    16   Temp:       TempSrc:       SpO2: 95%  94%    Weight:       Height:         Medications   sodium chloride 0.9 % flush 10 mL (has no administration in time range)   enoxaparin (LOVENOX) syringe 100 mg (has no administration in time range)   clopidogrel (PLAVIX) tablet 300 mg (has no administration in time range)   aspirin chewable tablet 324 mg (324 mg Oral Given 6/8/20 1636)   nitroglycerin (NITROSTAT) ointment 1 inch (1 inch Topical Given 6/8/20 1718)     ECG/EMG  Results (last 24 hours)     Procedure Component Value Units Date/Time    ECG 12 Lead [[id number]] Collected:  06/08/20 1605     Updated:  06/08/20 1736    Narrative:       Test Reason : chest pain  Blood Pressure : **/** mmHG  Vent. Rate : 085 BPM     Atrial Rate : 085 BPM     P-R Int : 116 ms          QRS Dur : 082 ms      QT Int : 380 ms       P-R-T Axes : 063 071 029 degrees     QTc Int : 452 ms    Normal sinus rhythm  Normal ECG  When compared with ECG of 25-APR-2018 20:05,  Non-specific change in ST segment in Inferior leads  QT has lengthened  Confirmed by SOL SUAZO MD ([id number]) on 6/8/2020 5:36:06 PM    Referred By:  EDMD           Confirmed By:SOL SUAZO MD    ECG 12 Lead [[id number]] Collected:  06/08/20 1808     Updated:  06/08/20 1809        ECG 12 Lead         ECG 12 Lead   Final Result   Test Reason : chest pain   Blood Pressure : **/** mmHG   Vent. Rate : 085 BPM     Atrial Rate : 085 BPM      P-R Int : 116 ms          QRS Dur : 082 ms       QT Int : 380 ms       P-R-T Axes : 063 071 029 degrees      QTc Int : 452 ms      Normal sinus rhythm   Normal ECG   When compared with ECG of 25-APR-2018 20:05,   Non-specific change in ST segment in Inferior leads   QT has lengthened   Confirmed by SOL SUAZO MD ([id number]) on 6/8/2020 5:36:06 PM      Referred By:  EDMD           Confirmed By:SOL SUAZO MD            COVID-19 RISK SCREEN     1. Has the patient had close contact without PPE with a lab confirmed COVID-19 (+) person or a person under investigation (PUI) for COVID-19 infection?  -- No     2. Has the patient had respiratory symptoms, worsened/new cough and/or SOA, unexplained fever, or sudden loss of smell and/or taste in the past 7 days? --  No    3. Does the patient have baseline higher exposure risk such as working in healthcare field, currently residing in healthcare facility, or ongoing hemodialysis?  --  No                                        HEART Score (for prediction of 6-week risk of  major adverse cardiac event) reviewed and/or performed as part of the patient evaluation and treatment planning process.  The result associated with this review/performance is: 3       MDM    Final diagnoses:   Unstable angina (CMS/[facility])            Lisette Rodriguez PA  06/08/20 3534

## 2020-06-09 ENCOUNTER — APPOINTMENT (OUTPATIENT)
Dept: CARDIOLOGY | Facility: HOSPITAL | Age: 45
End: 2020-06-09

## 2020-06-09 ENCOUNTER — READMISSION MANAGEMENT (OUTPATIENT)
Dept: CALL CENTER | Facility: HOSPITAL | Age: 45
End: 2020-06-09

## 2020-06-09 VITALS
SYSTOLIC BLOOD PRESSURE: 148 MMHG | HEART RATE: 74 BPM | BODY MASS INDEX: 29.51 KG/M2 | DIASTOLIC BLOOD PRESSURE: 89 MMHG | WEIGHT: 222.66 LBS | RESPIRATION RATE: 18 BRPM | TEMPERATURE: 97.9 F | HEIGHT: 73 IN | OXYGEN SATURATION: 96 %

## 2020-06-09 PROBLEM — I20.0 UNSTABLE ANGINA (HCC): Status: RESOLVED | Noted: 2020-06-08 | Resolved: 2020-06-09

## 2020-06-09 LAB
ANION GAP SERPL CALCULATED.3IONS-SCNC: 13 MMOL/L (ref 5–15)
APTT PPP: 33.8 SECONDS (ref 24–37)
BH CV STRESS BP STAGE 1: NORMAL
BH CV STRESS BP STAGE 2: NORMAL
BH CV STRESS BP STAGE 3: NORMAL
BH CV STRESS DURATION MIN STAGE 1: 3
BH CV STRESS DURATION MIN STAGE 2: 3
BH CV STRESS DURATION MIN STAGE 3: 3
BH CV STRESS DURATION MIN STAGE 4: 2
BH CV STRESS DURATION SEC STAGE 4: 2
BH CV STRESS GRADE STAGE 1: 10
BH CV STRESS GRADE STAGE 2: 12
BH CV STRESS GRADE STAGE 3: 14
BH CV STRESS GRADE STAGE 4: 16
BH CV STRESS HR STAGE 1: 103
BH CV STRESS HR STAGE 2: 129
BH CV STRESS HR STAGE 3: 151
BH CV STRESS HR STAGE 4: 169
BH CV STRESS METS STAGE 1: 5
BH CV STRESS METS STAGE 2: 7.5
BH CV STRESS METS STAGE 3: 10
BH CV STRESS METS STAGE 4: 13.5
BH CV STRESS O2 STAGE 1: 98
BH CV STRESS O2 STAGE 3: 97
BH CV STRESS O2 STAGE 4: 97
BH CV STRESS PROTOCOL 1: NORMAL
BH CV STRESS RECOVERY BP: NORMAL MMHG
BH CV STRESS RECOVERY HR: 93 BPM
BH CV STRESS SPEED STAGE 1: 1.7
BH CV STRESS SPEED STAGE 2: 2.5
BH CV STRESS SPEED STAGE 3: 3.4
BH CV STRESS SPEED STAGE 4: 4.2
BH CV STRESS STAGE 1: 1
BH CV STRESS STAGE 2: 2
BH CV STRESS STAGE 3: 3
BH CV STRESS STAGE 4: 4
BUN BLD-MCNC: 9 MG/DL (ref 6–20)
BUN/CREAT SERPL: 11.5 (ref 7–25)
CALCIUM SPEC-SCNC: 8.7 MG/DL (ref 8.6–10.5)
CHLORIDE SERPL-SCNC: 103 MMOL/L (ref 98–107)
CHOLEST SERPL-MCNC: 173 MG/DL (ref 0–200)
CO2 SERPL-SCNC: 26 MMOL/L (ref 22–29)
CREAT BLD-MCNC: 0.78 MG/DL (ref 0.76–1.27)
DEPRECATED RDW RBC AUTO: 41.1 FL (ref 37–54)
ERYTHROCYTE [DISTWIDTH] IN BLOOD BY AUTOMATED COUNT: 12.2 % (ref 12.3–15.4)
GFR SERPL CREATININE-BSD FRML MDRD: 108 ML/MIN/1.73
GLUCOSE BLD-MCNC: 106 MG/DL (ref 65–99)
HBA1C MFR BLD: 5.7 % (ref 4.8–5.6)
HCT VFR BLD AUTO: 39.7 % (ref 37.5–51)
HDLC SERPL-MCNC: 41 MG/DL (ref 40–60)
HGB BLD-MCNC: 13.4 G/DL (ref 13–17.7)
INR PPP: 1.03 (ref 0.85–1.16)
LDLC SERPL CALC-MCNC: 108 MG/DL (ref 0–100)
LDLC/HDLC SERPL: 2.64 {RATIO}
LV EF NUC BP: 63 %
MAGNESIUM SERPL-MCNC: 2 MG/DL (ref 1.6–2.6)
MAXIMAL PREDICTED HEART RATE: 176 BPM
MCH RBC QN AUTO: 31 PG (ref 26.6–33)
MCHC RBC AUTO-ENTMCNC: 33.8 G/DL (ref 31.5–35.7)
MCV RBC AUTO: 91.9 FL (ref 79–97)
PERCENT MAX PREDICTED HR: 96.02 %
PLATELET # BLD AUTO: 233 10*3/MM3 (ref 140–450)
PMV BLD AUTO: 9.3 FL (ref 6–12)
POTASSIUM BLD-SCNC: 3.7 MMOL/L (ref 3.5–5.2)
PROTHROMBIN TIME: 13.2 SECONDS (ref 11.5–14)
RBC # BLD AUTO: 4.32 10*6/MM3 (ref 4.14–5.8)
SODIUM BLD-SCNC: 142 MMOL/L (ref 136–145)
STRESS BASELINE BP: NORMAL MMHG
STRESS BASELINE HR: 67 BPM
STRESS PERCENT HR: 113 %
STRESS POST ESTIMATED WORKLOAD: 13.4 METS
STRESS POST EXERCISE DUR MIN: 11 MIN
STRESS POST EXERCISE DUR SEC: 2 SEC
STRESS POST O2 SAT PEAK: 98 %
STRESS POST PEAK BP: NORMAL MMHG
STRESS POST PEAK HR: 169 BPM
STRESS TARGET HR: 150 BPM
TRIGL SERPL-MCNC: 119 MG/DL (ref 0–150)
TROPONIN T SERPL-MCNC: <0.01 NG/ML (ref 0–0.03)
TSH SERPL DL<=0.05 MIU/L-ACNC: 1.47 UIU/ML (ref 0.27–4.2)
VLDLC SERPL-MCNC: 23.8 MG/DL
WBC NRBC COR # BLD: 7.17 10*3/MM3 (ref 3.4–10.8)

## 2020-06-09 PROCEDURE — 99217 PR OBSERVATION CARE DISCHARGE MANAGEMENT: CPT | Performed by: FAMILY MEDICINE

## 2020-06-09 PROCEDURE — G0378 HOSPITAL OBSERVATION PER HR: HCPCS

## 2020-06-09 PROCEDURE — 96361 HYDRATE IV INFUSION ADD-ON: CPT

## 2020-06-09 PROCEDURE — 85027 COMPLETE CBC AUTOMATED: CPT | Performed by: NURSE PRACTITIONER

## 2020-06-09 PROCEDURE — 93010 ELECTROCARDIOGRAM REPORT: CPT | Performed by: INTERNAL MEDICINE

## 2020-06-09 PROCEDURE — 85730 THROMBOPLASTIN TIME PARTIAL: CPT | Performed by: NURSE PRACTITIONER

## 2020-06-09 PROCEDURE — 83735 ASSAY OF MAGNESIUM: CPT | Performed by: NURSE PRACTITIONER

## 2020-06-09 PROCEDURE — 80061 LIPID PANEL: CPT | Performed by: NURSE PRACTITIONER

## 2020-06-09 PROCEDURE — 78452 HT MUSCLE IMAGE SPECT MULT: CPT | Performed by: INTERNAL MEDICINE

## 2020-06-09 PROCEDURE — 93005 ELECTROCARDIOGRAM TRACING: CPT | Performed by: NURSE PRACTITIONER

## 2020-06-09 PROCEDURE — 84443 ASSAY THYROID STIM HORMONE: CPT | Performed by: NURSE PRACTITIONER

## 2020-06-09 PROCEDURE — 93018 CV STRESS TEST I&R ONLY: CPT | Performed by: INTERNAL MEDICINE

## 2020-06-09 PROCEDURE — 93017 CV STRESS TEST TRACING ONLY: CPT

## 2020-06-09 PROCEDURE — 83036 HEMOGLOBIN GLYCOSYLATED A1C: CPT | Performed by: NURSE PRACTITIONER

## 2020-06-09 PROCEDURE — 84484 ASSAY OF TROPONIN QUANT: CPT | Performed by: NURSE PRACTITIONER

## 2020-06-09 PROCEDURE — A9500 TC99M SESTAMIBI: HCPCS | Performed by: NURSE PRACTITIONER

## 2020-06-09 PROCEDURE — 0 TECHNETIUM SESTAMIBI: Performed by: NURSE PRACTITIONER

## 2020-06-09 PROCEDURE — 80048 BASIC METABOLIC PNL TOTAL CA: CPT | Performed by: NURSE PRACTITIONER

## 2020-06-09 PROCEDURE — 78452 HT MUSCLE IMAGE SPECT MULT: CPT

## 2020-06-09 PROCEDURE — 85610 PROTHROMBIN TIME: CPT | Performed by: NURSE PRACTITIONER

## 2020-06-09 RX ADMIN — DULOXETINE HYDROCHLORIDE 60 MG: 60 CAPSULE, DELAYED RELEASE ORAL at 08:33

## 2020-06-09 RX ADMIN — SODIUM CHLORIDE, PRESERVATIVE FREE 10 ML: 5 INJECTION INTRAVENOUS at 08:34

## 2020-06-09 RX ADMIN — FLUTICASONE PROPIONATE 2 SPRAY: 50 SPRAY, METERED NASAL at 08:34

## 2020-06-09 RX ADMIN — PANTOPRAZOLE SODIUM 40 MG: 40 TABLET, DELAYED RELEASE ORAL at 06:28

## 2020-06-09 RX ADMIN — TECHNETIUM TC 99M SESTAMIBI 1 DOSE: 1 INJECTION INTRAVENOUS at 07:45

## 2020-06-09 RX ADMIN — TECHNETIUM TC 99M SESTAMIBI 1 DOSE: 1 INJECTION INTRAVENOUS at 10:05

## 2020-06-09 NOTE — PROGRESS NOTES
Continued Stay Note  [facility]     Patient Name: Satish Funes  MRN: [id number]  Today's Date: 6/9/2020    Admit Date: 6/8/2020    Discharge Plan     Row Name 06/09/20 1434       Plan    Plan  Home at DC    Patient/Family in Agreement with Plan  other (see comments)    Plan Comments  No DC needs identified at this time.        Discharge Codes    No documentation.       Expected Discharge Date and Time     Expected Discharge Date Expected Discharge Time    Jun 9, 2020             Dena Rodrigez RN

## 2020-06-09 NOTE — PLAN OF CARE
Problem: Cardiac: ACS (Acute Coronary Syndrome) (Adult)  Goal: Signs and Symptoms of Listed Potential Problems Will be Absent, Minimized or Managed (Cardiac: ACS)  Outcome: Ongoing (interventions implemented as appropriate)     Problem: Patient Care Overview  Goal: Plan of Care Review  Outcome: Ongoing (interventions implemented as appropriate)  Flowsheets (Taken 6/9/2020 3230)  Plan of Care Reviewed With: patient  Goal: Individualization and Mutuality  Outcome: Ongoing (interventions implemented as appropriate)  Goal: Discharge Needs Assessment  Outcome: Ongoing (interventions implemented as appropriate)  Flowsheets (Taken 6/8/2020 2108 by Caitie Kwong, RN)  Equipment Currently Used at Home: none  Transportation Anticipated: car, drives self  Patient/Family Anticipated Services at Transition: none  Patient/Family Anticipates Transition to: home  Goal: Interprofessional Rounds/Family Conf  Outcome: Ongoing (interventions implemented as appropriate)

## 2020-06-09 NOTE — OUTREACH NOTE
Prep Survey      Responses   [facility] patient discharged from?  [facility]   Is LACE score < 7 ?  Yes   [facility]   Date of Admission  06/08/20   Date of Discharge  06/09/20   Discharge Disposition  Home or Self Care   Discharge diagnosis  Unstable angina    COVID-19 Test Status  Not tested   Does the patient have one of the following disease processes/diagnoses(primary or secondary)?  Other   Does the patient have Home health ordered?  No   Is there a DME ordered?  No   Prep survey completed?  Yes          Patti Burnett RN

## 2020-06-09 NOTE — DISCHARGE SUMMARY
[facility] Medicine Services  DISCHARGE SUMMARY    Patient Name: Satish Funes  : 1975  MRN: [id number]    Date of Admission: 2020  4:28 PM  Date of Discharge:  2020  Primary Care Physician: Yovana Sorensen DO    Consults     No orders found for last 30 day(s).          Hospital Course     Presenting Problem:   Unstable angina (CMS/HCC) [I20.0]    Active Hospital Problems    Diagnosis  POA   • Depression [F32.9]  Yes   • Gastroesophageal reflux disease [K21.9]  Yes   • Hyperlipidemia [E78.5]  Yes      Resolved Hospital Problems    Diagnosis Date Resolved POA   • **Unstable angina (CMS/HCC) [I20.0] 2020 Yes          Hospital Course:  Satish Funes is a 44 y.o. male with PMH significant for HLD, GERD and depression who was admitted to [facility] on 2020 for intermittent chest pain for 1 week, occurring at rest and with activity.  He did not have SOA, N/V or diaphoresis.  He presented to the hospital due to increased frequency of the epidodes and + FH of coronary disease.  The patient had negative troponins and EKG was reassuring.  He underwent stress testing on 2020 that was unremarkable.      Discharge Follow Up Recommendations for outpatient labs/diagnostics:   NA    Day of Discharge     HPI:   As above    Review of Systems   Constitutional: Positive for fatigue.   Respiratory: Positive for chest tightness. Negative for shortness of breath.    Cardiovascular: Positive for chest pain.   Gastrointestinal: Negative for nausea and vomiting.   Musculoskeletal:        Shoulder pain (L)         Vital Signs:   Temp:  [97.6 °F (36.4 °C)-98.4 °F (36.9 °C)] 97.9 °F (36.6 °C)  Heart Rate:  [53-87] 74  Resp:  [16-18] 18  BP: (102-148)/(69-99) 148/89     Physical Exam:  Constitutional: Awake, alert  Eyes: PERRLA, sclerae anicteric, no conjunctival injection  HENT: NCAT, mucous membranes moist  Neck: Supple, no thyromegaly, no lymphadenopathy, trachea midline  Respiratory: Clear to  auscultation bilaterally, nonlabored respirations   Cardiovascular: RRR, no murmurs, rubs, or gallops, palpable pedal pulses bilaterally  Gastrointestinal: Positive bowel sounds, soft, nontender, nondistended  Musculoskeletal: No bilateral ankle edema, no clubbing or cyanosis to extremities  Psychiatric: Appropriate affect, cooperative  Neurologic: Oriented x 3, strength symmetric in all extremities, Cranial Nerves grossly intact to confrontation, speech clear  Skin: No rashes      Pertinent  and/or Most Recent Results     Results from last 7 days   Lab Units 06/09/20  0404 06/08/20  1608   WBC 10*3/mm3 7.17 5.92   HEMOGLOBIN g/dL 13.4 14.2   HEMATOCRIT % 39.7 41.8   PLATELETS 10*3/mm3 233 235   SODIUM mmol/L 142 140   POTASSIUM mmol/L 3.7 3.7   CHLORIDE mmol/L 103 102   CO2 mmol/L 26.0 25.0   BUN mg/dL 9 10   CREATININE mg/dL 0.78 0.79   GLUCOSE mg/dL 106* 130*   CALCIUM mg/dL 8.7 8.9     Results from last 7 days   Lab Units 06/09/20  0404 06/08/20  1608   BILIRUBIN mg/dL  --  0.4   ALK PHOS U/L  --  119*   ALT (SGPT) U/L  --  18   AST (SGOT) U/L  --  21   PROTIME Seconds 13.2  --    INR  1.03  --    APTT seconds 33.8  --      Results from last 7 days   Lab Units 06/09/20  0404   CHOLESTEROL mg/dL 173   TRIGLYCERIDES mg/dL 119   HDL CHOL mg/dL 41     Results from last 7 days   Lab Units 06/09/20  0404 06/08/20  2223 06/08/20  1811 06/08/20  1608   TSH uIU/mL 1.470  --   --   --    HEMOGLOBIN A1C % 5.70*  --   --   --    PROBNP pg/mL  --   --   --  12.3   TROPONIN T ng/mL <0.010 <0.010 <0.010 <0.010       Brief Urine Lab Results     None          Microbiology Results Abnormal     None          Imaging Results (All)     Procedure Component Value Units Date/Time    XR Chest 1 View [[id number]] Collected:  06/08/20 1659     Updated:  06/08/20 1908    Narrative:       EXAMINATION: XR CHEST 1 VW- 06/08/2020     INDICATION: Chest Pain triage protocol      COMPARISON: Chest x-ray dated 04/25/2018     FINDINGS: Cardiac size  within normal limits. Pulmonary vascularity  within normal limits. No focal opacification or consolidation. No  pneumothorax or pleural effusion. Degenerative changes of the spine.           Impression:       No acute cardiopulmonary process.     D:  06/08/2020  E:  06/08/2020     This report was finalized on 6/8/2020 7:05 PM by Dr. Jung Preciado.                            Plan for Follow-up of Pending Labs/Results: NA    Discharge Details        Discharge Medications      Continue These Medications      Instructions Start Date   atorvastatin 20 MG tablet  Commonly known as:  LIPITOR   20 mg, Oral, Daily      cetirizine-pseudoephedrine 5-120 MG per 12 hr tablet  Commonly known as:  ZyrTEC-D   1 tablet, Oral, 2 Times Daily      diclofenac 75 MG EC tablet  Commonly known as:  VOLTAREN   75 mg, Oral, 2 Times Daily      DULoxetine 60 MG capsule  Commonly known as:  CYMBALTA   60 mg, Oral, Daily      fluticasone 50 MCG/ACT nasal spray  Commonly known as:  FLONASE   2 sprays, Nasal, Daily      omeprazole 20 MG capsule  Commonly known as:  priLOSEC   20 mg, Oral, Daily             No Known Allergies      Discharge Disposition:  Home or Self Care    Diet:  Hospital:  Diet Order   Procedures   • Diet Regular; Cardiac, Consistent Carbohydrate       Activity:  As tolerated    Restrictions or Other Recommendations:  NA       CODE STATUS:    Code Status and Medical Interventions:   Ordered at: 06/08/20 2042     Code Status:    CPR     Medical Interventions (Level of Support Prior to Arrest):    Full       Future Appointments   Date Time Provider Department Center   8/28/2020  3:00 PM Yovana Sorensen DO [facility] None       Additional Instructions for the Follow-ups that You Need to Schedule     Discharge Follow-up with PCP   As directed       Currently Documented PCP:    Yovana Sorensen DO    PCP Phone Number:    [phone]     Follow Up Details:  As needed               Time Spent on Discharge:  35  minutes    Electronically signed by Heidy Aranda MD, 06/09/20, 4:34 PM.

## 2020-06-09 NOTE — H&P
"    [facility] Medicine Services  Clinical Decision Unit (CDU)  HISTORY & PHYSICAL    Patient Name: Satish Funes  : 1975  MRN: [id number]  Primary Care Physician: Yovana Sorensen DO  Date of admission: 2020  4:28 PM      Subjective   Subjective     Chief Complaint:  Chest pain     HPI:  Satish Funes is a 44 y.o. male w/ a hx of HLD, GERD and depression who presented to the ED w/ c/o chest pain.   Pt c/o intermittent chest pain x 1 week. Pt reports that the episodes are random, w/ onset during rest and w/ exertion. Pain located left chest. Pt has intermittent radiation of pain to his left arm and hand, c/o left hand numbness w/ some episodes of chest pain. Pt denies associated shortness of breath, nausea, vomiting or diaphoresis. At worst pain rated 4/10. Episodes resolve w/ rest, pt denies taking any medications, nitro, etc. Pt c/o daily episodes that are becoming more frequent since onset 1 week ago.  Additionally, pt c/o generalized fatigue \"for some time now\", and add that he hasnt been sleeping well lately.   Pt denies known hx of heart disease. Underwent a cardiac stress test >10 yrs ago what was \"normal\"; no hx of cardiac cath. Pt's mother and father both w/ hx of CAD.   Pt denies recent illness or known sick contacts. Pt denies fever/chills, dyspnea, cough, N/V/D, abdominal pain, dysuria, edema, syncope, confusion, lightheadedness, loss of smell/taste.   Pt evaluated in the ED. EKG and initial troponin unremarkable. Pt admitted to the hospital medicine service for further evaluation.       Review of Systems   Constitutional: Positive for fatigue. Negative for activity change, appetite change, chills, diaphoresis, fever and unexpected weight change.   HENT: Negative.    Eyes: Negative.    Respiratory: Negative for cough and shortness of breath.    Cardiovascular: Positive for chest pain. Negative for palpitations and leg swelling.        Intermittent episodes of chest pain " x 1 week.    Gastrointestinal: Negative.  Negative for abdominal distention, abdominal pain, constipation, diarrhea, nausea and vomiting.   Endocrine: Negative.    Genitourinary: Negative.  Negative for decreased urine volume, difficulty urinating, dysuria, flank pain, frequency, hematuria and urgency.   Musculoskeletal: Negative.  Negative for arthralgias and myalgias.   Skin: Negative.  Negative for wound.   Allergic/Immunologic: Negative.    Neurological: Negative.  Negative for dizziness, syncope, weakness, light-headedness and headaches.   Hematological: Negative.    Psychiatric/Behavioral: Negative.  Negative for confusion.   All other systems reviewed and are negative.       Personal History     Past Medical History:   Diagnosis Date   • Chronic left shoulder pain 6/5/2019   • Depression    • Fractures    • GERD (gastroesophageal reflux disease)    • Hyperlipidemia    • Joint pain 6/5/2019   • Kidney stone    • Mood disorder (CMS/[facility]) 4/20/2018   • Other fatigue 4/20/2018       Past Surgical History:   Procedure Laterality Date   • ORIF WRIST FRACTURE Right 2010    plate and 7 pins placed       Family History: family history includes Aneurysm in his paternal uncle; Arthritis in his mother; COPD in his father, maternal aunt, and maternal uncle; Diabetes in his mother; Heart attack in his maternal aunt, maternal aunt, maternal uncle, and paternal grandfather; Heart attack (age of onset: 62) in his father; Heart disease in his father, maternal aunt, and maternal uncle; Heart disease (age of onset: 53) in his mother; Hyperlipidemia in his maternal aunt, maternal uncle, and mother; Hypertension in his father, maternal aunt, maternal uncle, and mother; Liver disease in his father; No Known Problems in his maternal uncle, paternal aunt, paternal aunt, paternal grandmother, and paternal uncle; Obesity in his mother; Stroke in his maternal grandmother and mother. Otherwise pertinent FHx was reviewed and unremarkable.       Social History:  reports that he has never smoked. He has never used smokeless tobacco. He reports that he drinks alcohol. He reports that he does not use drugs.  Social History     Social History Narrative   • Not on file       Medications:  Available home medication information reviewed.    (Not in a hospital admission)    No Known Allergies    Objective   Objective     Vital Signs:   Temp:  [99 °F (37.2 °C)] 99 °F (37.2 °C)  Heart Rate:  [70-84] 73  Resp:  [16] 16  BP: (126-144)/(73-92) 134/84        Physical Exam   Constitutional: He is oriented to person, place, and time. He appears well-developed and well-nourished. No distress.   HENT:   Head: Normocephalic and atraumatic.   Eyes: Pupils are equal, round, and reactive to light. EOM are normal.   Neck: Normal range of motion. Neck supple.   Cardiovascular: Normal rate, regular rhythm, normal heart sounds and intact distal pulses. Exam reveals no gallop and no friction rub.   No murmur heard.  Pulmonary/Chest: Effort normal and breath sounds normal. No stridor. No respiratory distress. He has no wheezes. He has no rales. He exhibits no tenderness.   Abdominal: Soft. Bowel sounds are normal. He exhibits no distension. There is no tenderness. There is no guarding.   Musculoskeletal: Normal range of motion. He exhibits no edema, tenderness or deformity.   Neurological: He is alert and oriented to person, place, and time. No cranial nerve deficit.   Skin: Skin is warm and dry. Capillary refill takes more than 3 seconds. No rash noted. He is not diaphoretic. No erythema. No pallor.   Psychiatric: He has a normal mood and affect. His behavior is normal. Judgment and thought content normal.   Nursing note and vitals reviewed.       Results Reviewed:    CXR: No acute process  EKG: NSR  Troponin: <0.010 (x2), D.Dimer: 0.27, proBNP: 12.3  Glucose 130, alk phos 119, lipase 24  WBC: 5.92, H/H: 14.2/41.8, platelet: 235      Assessment/Plan   Assessment / Plan     Active  Hospital Problems    Diagnosis POA   • **Unstable angina (CMS/HCC) [I20.0] Yes   • Depression [F32.9] Yes   • Gastroesophageal reflux disease [K21.9] Yes   • Hyperlipidemia [E78.5] Yes       Assessment & Plan    **Unstable angina   -pt c/o intermittent chest pain x 1 week; onset at rest and w/ exertion; occasional radiation to left arm w/ left hand numbness; no other associated symptoms   -CXR negative  -D.Dimer and proBNP WNL   -EKG obtained in ED; repeat in am or PRN w/ chest pain  -troponin <0.010 (x2); trend overnight  -s/p ASA 324mg, Plavix 300mg and Lovenox 100mg- given in ED  -continuous telemetry and pulse oximetry  -PRN nitro for chest pain, PRN antiemetics  -NS @ 75ml/hour x 1 liter overnight  -tsh, lipid panel, hem a1c, cbc, bmp, coags- am labs  -NPO after midnight  -cardiac stress test ordered for tomorrow am  -consider Cardiology consult pending stress results     **HLD  -continue routine Lipitor 20mg daily  -lipid panel w/ am labs    **GERD  -continue routine Prilosec (sub Protonix)    **Depression  -continue routine Cymbalta       Admission Communication  Due to current limited visitation policies, an attempt will be made to update patient's identified best point-of-contact(s)   Contact: Pt did not want me to contact anyone.    Relation:    Time of communication:    Notes (if applicable):        CODE STATUS:    Code Status and Medical Interventions:   Ordered at: 06/08/20 2042     Code Status:    CPR     Medical Interventions (Level of Support Prior to Arrest):    Full       Admission Status:   I believe this patient meets OBSERVATION status, however if further evaluation or treatment plans warrant, status may change.  Based upon current information, I predict patient's care encounter to be less than or equal to 2 midnights.      Discharge Blueprint (criteria for discharge):   1. Pt chest pain free.  2. Pt completes cardiac stress test.     Electronically signed by GENE Munoz, 06/08/20,  8:16 PM.

## 2020-06-10 ENCOUNTER — TRANSITIONAL CARE MANAGEMENT TELEPHONE ENCOUNTER (OUTPATIENT)
Dept: CALL CENTER | Facility: HOSPITAL | Age: 45
End: 2020-06-10

## 2020-06-10 NOTE — OUTREACH NOTE
Call Center TCM Note      Responses   [facility] patient discharged from?  [place]   COVID-19 Test Status  Not tested   Does the patient have one of the following disease processes/diagnoses(primary or secondary)?  Other   TCM attempt successful?  No   Unsuccessful attempts  Attempt 1   Call Status  Left message          Jenny Baldwin MA    6/10/2020, 10:56

## 2020-06-10 NOTE — OUTREACH NOTE
Call Center TCM Note      Responses   [facility] patient discharged from?  [place]   COVID-19 Test Status  Not tested   Does the patient have one of the following disease processes/diagnoses(primary or secondary)?  Other   TCM attempt successful?  Yes   Call start time  1541   Call end time  1543   Discharge diagnosis  Unstable angina    Meds reviewed with patient/caregiver?  N/A   Does the patient have all medications ordered at discharge?  N/A   Prescription comments  No changes to pt medications   Is the patient taking all medications as directed (includes completed medication regime)?  N/A   Does the patient have a primary care provider?   Yes   Does the patient have an appointment with their PCP within 7 days of discharge?  No   Comments regarding PCP  Tamara Sorensen    Has the patient kept scheduled appointments due by today?  N/A   Comments  Pt states he called PCP office and was told since symptoms resolved, and negative cardiac wrkup to just keep 08/2020 fwp already scheduled.    Has home health visited the patient within 72 hours of discharge?  N/A   Did the patient receive a copy of their discharge instructions?  Yes   Nursing interventions  Reviewed instructions with patient   What is the patient's perception of their health status since discharge?  Improving   Is the patient/caregiver able to teach back signs and symptoms related to disease process for when to call PCP?  Yes   Is the patient/caregiver able to teach back signs and symptoms related to disease process for when to call 911?  Yes   Is the patient/caregiver able to teach back the hierarchy of who to call/visit for symptoms/problems? PCP, Specialist, Home health nurse, Urgent Care, ED, 911  Yes   TCM call completed?  Yes   Wrap up additional comments  Pt states he feels fine. No further chest pain, N/V, sweats or SOB to report. Pt states he will, per PCP office keep 08/2020 fwp already sched.           Jenny Baldwin,  MA    6/10/2020, 15:46

## 2020-08-10 ENCOUNTER — TELEPHONE (OUTPATIENT)
Dept: INTERNAL MEDICINE | Facility: CLINIC | Age: 45
End: 2020-08-10

## 2020-08-10 DIAGNOSIS — Z20.822 CLOSE EXPOSURE TO COVID-19 VIRUS: Primary | ICD-10-CM

## 2020-08-10 DIAGNOSIS — R52 BODY ACHES: ICD-10-CM

## 2020-08-10 NOTE — TELEPHONE ENCOUNTER
Please notify patient I have placed an order.  He may go after 3pm to have this done at the [facility] [place] by [facility] urgent care.  He will need to call them when he arrives and let them know I have placed an order.

## 2020-08-10 NOTE — TELEPHONE ENCOUNTER
PATIENT STATES HE WAS EXPOSED TO SOMEONE WITH COVID 19 ON Friday 31  PATIENT HAS A HEADACHE AND FEELS STOPPED UP     PLEASE ADVISE IF THE PATIENT SHOULD HAVE A COVID 19 TEST AND WHERE TO GO     PATIENT CALL BACK [phone]

## 2020-08-12 ENCOUNTER — TELEPHONE (OUTPATIENT)
Dept: INTERNAL MEDICINE | Facility: CLINIC | Age: 45
End: 2020-08-12

## 2020-08-12 NOTE — TELEPHONE ENCOUNTER
PATIENT CALLED AND STATED HE WAS SUPPOSE TO GET A CALL TODAY REGARDING HIS COVID TEST RESULTS     PLEASE CALL AND ADVISE [phone]

## 2020-09-22 ENCOUNTER — OFFICE VISIT (OUTPATIENT)
Dept: INTERNAL MEDICINE | Facility: CLINIC | Age: 45
End: 2020-09-22

## 2020-09-22 VITALS
BODY MASS INDEX: 30.48 KG/M2 | HEART RATE: 94 BPM | OXYGEN SATURATION: 98 % | WEIGHT: 230 LBS | TEMPERATURE: 98.9 F | HEIGHT: 73 IN | SYSTOLIC BLOOD PRESSURE: 130 MMHG | DIASTOLIC BLOOD PRESSURE: 90 MMHG

## 2020-09-22 DIAGNOSIS — M25.512 CHRONIC LEFT SHOULDER PAIN: Primary | ICD-10-CM

## 2020-09-22 DIAGNOSIS — G89.29 CHRONIC LEFT SHOULDER PAIN: Primary | ICD-10-CM

## 2020-09-22 DIAGNOSIS — Z23 NEED FOR VACCINATION: ICD-10-CM

## 2020-09-22 PROCEDURE — 90686 IIV4 VACC NO PRSV 0.5 ML IM: CPT | Performed by: FAMILY MEDICINE

## 2020-09-22 PROCEDURE — 99213 OFFICE O/P EST LOW 20 MIN: CPT | Performed by: FAMILY MEDICINE

## 2020-09-22 PROCEDURE — 90471 IMMUNIZATION ADMIN: CPT | Performed by: FAMILY MEDICINE

## 2020-09-22 RX ORDER — TIZANIDINE 4 MG/1
4 TABLET ORAL EVERY 8 HOURS PRN
Qty: 90 TABLET | Refills: 1 | Status: SHIPPED | OUTPATIENT
Start: 2020-09-22 | End: 2021-02-26

## 2020-09-22 RX ORDER — HYDROCODONE BITARTRATE AND ACETAMINOPHEN 7.5; 325 MG/1; MG/1
1 TABLET ORAL EVERY 6 HOURS PRN
Qty: 12 TABLET | Refills: 0 | Status: SHIPPED | OUTPATIENT
Start: 2020-09-22 | End: 2021-02-12

## 2020-09-22 RX ORDER — ETODOLAC 500 MG/1
500 TABLET, FILM COATED ORAL 2 TIMES DAILY
Qty: 60 TABLET | Refills: 1 | Status: SHIPPED | OUTPATIENT
Start: 2020-09-22 | End: 2020-12-17

## 2020-09-22 NOTE — PROGRESS NOTES
Satish Funes is a 44 y.o. male.    Chief Complaint   Patient presents with   • Neck Pain     pain worse on Thursday        HPI   Patient has been complaining of shoulder - bilateral pain for 5 days.  He lifted a fence post and had instant pain.  He had had pain to this same shoulder off and on for several years ago a horse had stepped on it.  He has complained a few times over the last year of intermittent pain to the left shoulder.  He states the pain he has currently is the worse it has every been. Pain is a 7 on a scale of 0-10. Pain is ache, sharp and constant. Pain radiates to biceps and into shoulder blade and left neck.  He reports numbness into left arm at times as well. Pain is worse with lifting and movement, better with nothing. Symptoms are unchanged.  Patient has tried diclofenac, baclofen, robaxin, steroids, toradol, norco with little relief.  He only received some relief with norco for about 4 hours.  He is unable to raise his left arm past a certain point and reports weakness to the arm and shoulder as well.      The following portions of the patient's history were reviewed and updated as appropriate: allergies, current medications, past family history, past medical history, past social history, past surgical history and problem list.     No Known Allergies      Current Outpatient Medications:   •  atorvastatin (LIPITOR) 20 MG tablet, Take 1 tablet by mouth Daily., Disp: 90 tablet, Rfl: 1  •  cetirizine-pseudoephedrine (ZyrTEC-D) 5-120 MG per 12 hr tablet, Take 1 tablet by mouth 2 (Two) Times a Day., Disp: , Rfl:   •  diclofenac (VOLTAREN) 1 % gel gel, Apply 4 g topically to the appropriate area as directed 4 (Four) Times a Day As Needed (neck pain)., Disp: 100 g, Rfl: 0  •  DULoxetine (CYMBALTA) 60 MG capsule, Take 1 capsule by mouth Daily., Disp: 90 capsule, Rfl: 3  •  fluticasone (FLONASE) 50 MCG/ACT nasal spray, 2 sprays into the nostril(s) as directed by provider Daily., Disp: , Rfl:   •   "omeprazole (priLOSEC) 20 MG capsule, Take 1 capsule by mouth Daily., Disp: 90 capsule, Rfl: 3  •  predniSONE (DELTASONE) 20 MG tablet, 1 po tid x 5 days, then 1 po bid x 5 days, Disp: 25 tablet, Rfl: 0  •  etodolac (LODINE) 500 MG tablet, Take 1 tablet by mouth 2 (Two) Times a Day., Disp: 60 tablet, Rfl: 1  •  HYDROcodone-acetaminophen (Norco) 7.5-325 MG per tablet, Take 1 tablet by mouth Every 6 (Six) Hours As Needed for Moderate Pain  or Severe Pain ., Disp: 12 tablet, Rfl: 0  •  tiZANidine (ZANAFLEX) 4 MG tablet, Take 1 tablet by mouth Every 8 (Eight) Hours As Needed for Muscle Spasms., Disp: 90 tablet, Rfl: 1    ROS    Review of Systems   Constitutional: Negative for chills and fever.   Respiratory: Negative for shortness of breath.    Gastrointestinal: Negative for abdominal distention, diarrhea, nausea, vomiting and GERD.   Musculoskeletal: Positive for arthralgias, myalgias, neck pain and neck stiffness.   Neurological: Positive for weakness.       Vitals:    09/22/20 1528   BP: 130/90   Pulse: 94   Temp: 98.9 °F (37.2 °C)   TempSrc: Temporal   SpO2: 98%   Weight: 104 kg (230 lb)   Height: 185.4 cm (73\")   PainSc:   7   PainLoc: Neck     Body mass index is 30.34 kg/m².    Physical Exam     Physical Exam  Constitutional:       General: He is not in acute distress.     Appearance: He is well-developed.   HENT:      Head: Normocephalic and atraumatic.      Right Ear: External ear normal.      Left Ear: External ear normal.   Eyes:      Extraocular Movements: Extraocular movements intact.      Conjunctiva/sclera: Conjunctivae normal.   Neck:      Musculoskeletal: Normal range of motion and neck supple.   Cardiovascular:      Rate and Rhythm: Normal rate and regular rhythm.      Heart sounds: No murmur.   Pulmonary:      Effort: Pulmonary effort is normal. No respiratory distress.      Breath sounds: Normal breath sounds. No wheezing.   Abdominal:      Palpations: Abdomen is soft.   Musculoskeletal:      Left " shoulder: He exhibits decreased range of motion, tenderness, pain and spasm. He exhibits no bony tenderness.      Comments: Spasm into left cervical spine and biceps muscle   Lymphadenopathy:      Cervical: No cervical adenopathy.   Skin:     General: Skin is warm and dry.   Neurological:      Mental Status: He is alert and oriented to person, place, and time.      Cranial Nerves: No cranial nerve deficit.         Assessment/Plan    Problem List Items Addressed This Visit        Nervous and Auditory    Chronic left shoulder pain - Primary     Patient has an exacerbation of an old injury.  He has likely reinjured his shoulder.  I am concerned about a possible ligament or tendon tear with the amount of pain he is in along with decreased ROM.  Will proceed with an MRI of the left shoulder for further evaluation.  Will change NSAID to lodine and muscle relaxant to zanaflex.  Will also provide short 3 day supply of norco to help control pain.          Relevant Orders    MRI Shoulder Left Without Contrast      Other Visit Diagnoses     Need for vaccination        Relevant Orders    Fluarix Quad >6 Months ([facility]) ([id number]) (Completed)          New Medications Ordered This Visit   Medications   • tiZANidine (ZANAFLEX) 4 MG tablet     Sig: Take 1 tablet by mouth Every 8 (Eight) Hours As Needed for Muscle Spasms.     Dispense:  90 tablet     Refill:  1   • HYDROcodone-acetaminophen (Norco) 7.5-325 MG per tablet     Sig: Take 1 tablet by mouth Every 6 (Six) Hours As Needed for Moderate Pain  or Severe Pain .     Dispense:  12 tablet     Refill:  0   • etodolac (LODINE) 500 MG tablet     Sig: Take 1 tablet by mouth 2 (Two) Times a Day.     Dispense:  60 tablet     Refill:  1       Orders Placed This Encounter   Procedures   • Fluarix Quad >6 Months ([facility]) ([id number])       No follow-ups on file.    Yovana Sorensen DO

## 2020-09-22 NOTE — ASSESSMENT & PLAN NOTE
Patient has an exacerbation of an old injury.  He has likely reinjured his shoulder.  I am concerned about a possible ligament or tendon tear with the amount of pain he is in along with decreased ROM.  Will proceed with an MRI of the left shoulder for further evaluation.  Will change NSAID to lodine and muscle relaxant to zanaflex.  Will also provide short 3 day supply of norco to help control pain.

## 2020-09-23 ENCOUNTER — HOSPITAL ENCOUNTER (OUTPATIENT)
Dept: MRI IMAGING | Facility: HOSPITAL | Age: 45
End: 2020-09-23

## 2020-09-24 ENCOUNTER — HOSPITAL ENCOUNTER (OUTPATIENT)
Dept: MRI IMAGING | Facility: HOSPITAL | Age: 45
Discharge: HOME | End: 2020-09-24
Admitting: FAMILY MEDICINE

## 2020-09-24 PROCEDURE — 73221 MRI JOINT UPR EXTREM W/O DYE: CPT

## 2020-09-25 DIAGNOSIS — M75.102 TEAR OF LEFT SUPRASPINATUS TENDON: Primary | ICD-10-CM

## 2020-09-25 DIAGNOSIS — M25.512 ACUTE PAIN OF LEFT SHOULDER: ICD-10-CM

## 2020-09-28 ENCOUNTER — TELEPHONE (OUTPATIENT)
Dept: INTERNAL MEDICINE | Facility: CLINIC | Age: 45
End: 2020-09-28

## 2020-09-28 NOTE — TELEPHONE ENCOUNTER
This has already been resulted last week.  Please call back with previous result note.  He has been referred to ortho.

## 2020-12-17 RX ORDER — ETODOLAC 500 MG/1
TABLET, FILM COATED ORAL
Qty: 180 TABLET | Refills: 3 | Status: SHIPPED | OUTPATIENT
Start: 2020-12-17 | End: 2021-02-26

## 2021-01-05 ENCOUNTER — OFFICE VISIT (OUTPATIENT)
Dept: INTERNAL MEDICINE | Facility: CLINIC | Age: 46
End: 2021-01-05

## 2021-01-05 DIAGNOSIS — J01.90 ACUTE NON-RECURRENT SINUSITIS, UNSPECIFIED LOCATION: Primary | ICD-10-CM

## 2021-01-05 PROCEDURE — 99441 PR PHYS/QHP TELEPHONE EVALUATION 5-10 MIN: CPT | Performed by: FAMILY MEDICINE

## 2021-01-05 RX ORDER — AMOXICILLIN AND CLAVULANATE POTASSIUM 875; 125 MG/1; MG/1
1 TABLET, FILM COATED ORAL EVERY 12 HOURS SCHEDULED
Qty: 20 TABLET | Refills: 0 | Status: SHIPPED | OUTPATIENT
Start: 2021-01-05 | End: 2021-02-26

## 2021-01-05 RX ORDER — AMOXICILLIN AND CLAVULANATE POTASSIUM 875; 125 MG/1; MG/1
1 TABLET, FILM COATED ORAL EVERY 12 HOURS SCHEDULED
Qty: 20 TABLET | Refills: 0 | Status: SHIPPED | OUTPATIENT
Start: 2021-01-05 | End: 2021-01-05 | Stop reason: SDUPTHER

## 2021-01-05 NOTE — PROGRESS NOTES
You have chosen to receive care through a telephone visit. Do you consent to use a telephone visit for your medical care today?  Yes    On this call are Yovana Sorensen DO and Satish Funes

## 2021-01-05 NOTE — PROGRESS NOTES
Satish Funes is a 45 y.o. male.    Chief Complaint   Patient presents with   • Earache     During today's visit, I reviewed the documented allergies, medications, chief complaint, and pertinent vitals.  I have confirmed with the patient that there have been no changes since this information was discussed with my clinical team member.      HPI   Patient reports they have not been feeling well for 5day(s). He admits to nasal congestion, ear pain, headache, cough.  He denies fever, dyspnea.  They have tried mucinex daytime and night time, flonase for this issue with some response.    The following portions of the patient's history were reviewed and updated as appropriate: allergies, current medications, past family history, past medical history, past social history, past surgical history and problem list.     No Known Allergies      Current Outpatient Medications:   •  atorvastatin (LIPITOR) 20 MG tablet, Take 1 tablet by mouth Daily., Disp: 90 tablet, Rfl: 1  •  DULoxetine (CYMBALTA) 60 MG capsule, Take 1 capsule by mouth Daily., Disp: 90 capsule, Rfl: 3  •  etodolac (LODINE) 500 MG tablet, TAKE 1 TABLET BY MOUTH TWICE A DAY, Disp: 180 tablet, Rfl: 3  •  fluticasone (FLONASE) 50 MCG/ACT nasal spray, 2 sprays into the nostril(s) as directed by provider Daily., Disp: , Rfl:   •  amoxicillin-clavulanate (Augmentin) 875-125 MG per tablet, Take 1 tablet by mouth Every 12 (Twelve) Hours., Disp: 20 tablet, Rfl: 0  •  cetirizine-pseudoephedrine (ZyrTEC-D) 5-120 MG per 12 hr tablet, Take 1 tablet by mouth 2 (Two) Times a Day., Disp: , Rfl:   •  diclofenac (VOLTAREN) 1 % gel gel, Apply 4 g topically to the appropriate area as directed 4 (Four) Times a Day As Needed (neck pain)., Disp: 100 g, Rfl: 0  •  HYDROcodone-acetaminophen (Norco) 7.5-325 MG per tablet, Take 1 tablet by mouth Every 6 (Six) Hours As Needed for Moderate Pain  or Severe Pain ., Disp: 12 tablet, Rfl: 0  •  omeprazole (priLOSEC) 20 MG capsule, Take 1 capsule  by mouth Daily., Disp: 90 capsule, Rfl: 3  •  predniSONE (DELTASONE) 20 MG tablet, 1 po tid x 5 days, then 1 po bid x 5 days, Disp: 25 tablet, Rfl: 0  •  tiZANidine (ZANAFLEX) 4 MG tablet, Take 1 tablet by mouth Every 8 (Eight) Hours As Needed for Muscle Spasms., Disp: 90 tablet, Rfl: 1    ROS    Review of Systems   Constitutional: Negative for chills and fever.   HENT: Positive for congestion, ear pain and postnasal drip.    Respiratory: Positive for cough. Negative for shortness of breath and wheezing.    Cardiovascular: Negative for chest pain.   Neurological: Positive for headache.       There were no vitals filed for this visit.  There is no height or weight on file to calculate BMI.    Physical Exam     Physical Exam    Assessment/Plan    Problems Addressed this Visit     None      Visit Diagnoses     Acute non-recurrent sinusitis, unspecified location    -  Primary        Patient has been encouraged to continue over-the-counter medication.  He is also been encouraged to do sinus rinses.  We will add in Augmentin for treatment of sinus infection.    This visit has been rescheduled as a phone visit to comply with patient safety concerns in accordance with CDC recommendations. Total time of discussion was 5 minutes.      New Medications Ordered This Visit   Medications   • amoxicillin-clavulanate (Augmentin) 875-125 MG per tablet     Sig: Take 1 tablet by mouth Every 12 (Twelve) Hours.     Dispense:  20 tablet     Refill:  0       No orders of the defined types were placed in this encounter.      No follow-ups on file.    Yovana Sorensen DO

## 2021-02-12 ENCOUNTER — TELEPHONE (OUTPATIENT)
Dept: INTERNAL MEDICINE | Facility: CLINIC | Age: 46
End: 2021-02-12

## 2021-02-12 RX ORDER — HYDROCODONE BITARTRATE AND ACETAMINOPHEN 7.5; 325 MG/1; MG/1
1 TABLET ORAL EVERY 6 HOURS PRN
Qty: 12 TABLET | Refills: 0 | Status: SHIPPED | OUTPATIENT
Start: 2021-02-12 | End: 2021-02-26

## 2021-02-12 NOTE — TELEPHONE ENCOUNTER
Patient contacted office regarding back pain. Patient is scheduled Wednesday [facility] to receive epidural injection. Patient states that previous injection has wore off and his back is locked up.     Patient is requesting some pain medication to get him through until he can have injection. Patient states muscle relaxer does not work.     Please advise and I will contact patient.

## 2021-02-26 ENCOUNTER — TELEMEDICINE (OUTPATIENT)
Dept: INTERNAL MEDICINE | Facility: CLINIC | Age: 46
End: 2021-02-26

## 2021-02-26 ENCOUNTER — LAB (OUTPATIENT)
Dept: LAB | Facility: HOSPITAL | Age: 46
End: 2021-02-26

## 2021-02-26 DIAGNOSIS — J04.0 LARYNGITIS: ICD-10-CM

## 2021-02-26 DIAGNOSIS — R05.9 COUGH: Primary | ICD-10-CM

## 2021-02-26 DIAGNOSIS — J06.9 UPPER RESPIRATORY TRACT INFECTION, UNSPECIFIED TYPE: ICD-10-CM

## 2021-02-26 PROCEDURE — C9803 HOPD COVID-19 SPEC COLLECT: HCPCS

## 2021-02-26 PROCEDURE — 99212 OFFICE O/P EST SF 10 MIN: CPT | Performed by: FAMILY MEDICINE

## 2021-02-26 PROCEDURE — U0004 COV-19 TEST NON-CDC HGH THRU: HCPCS | Performed by: FAMILY MEDICINE

## 2021-02-26 RX ORDER — METHYLPREDNISOLONE 4 MG/1
TABLET ORAL
Qty: 21 EACH | Refills: 0 | OUTPATIENT
Start: 2021-02-26 | End: 2021-07-07

## 2021-02-26 RX ORDER — AMOXICILLIN AND CLAVULANATE POTASSIUM 875; 125 MG/1; MG/1
1 TABLET, FILM COATED ORAL EVERY 12 HOURS SCHEDULED
Qty: 20 TABLET | Refills: 0 | OUTPATIENT
Start: 2021-02-26 | End: 2021-07-07

## 2021-02-26 RX ORDER — BENZONATATE 100 MG/1
100 CAPSULE ORAL 3 TIMES DAILY PRN
Qty: 30 CAPSULE | Refills: 0 | Status: SHIPPED | OUTPATIENT
Start: 2021-02-26 | End: 2021-08-19

## 2021-02-26 NOTE — PROGRESS NOTES
Satish Funes is a 45 y.o. male.    Chief Complaint   Patient presents with   • Sore Throat     During today's visit, I reviewed the documented allergies, medications, chief complaint, and pertinent vitals.  I have confirmed with the patient that there have been no changes since this information was discussed with my clinical team member.      HPI   Patient reports they have not been feeling well for 3day(s). She admits to nasal congestion, ear pain, headache, cough, hoarseness.  He denies fever.  They have tried mucinex daytime and nighttime and zyrtec D for this issue with minimal response.  Denies any sick contacts.      The following portions of the patient's history were reviewed and updated as appropriate: allergies, current medications, past family history, past medical history, past social history, past surgical history and problem list.     No Known Allergies      Current Outpatient Medications:   •  DULoxetine (CYMBALTA) 60 MG capsule, Take 1 capsule by mouth Daily., Disp: 90 capsule, Rfl: 3  •  fluticasone (FLONASE) 50 MCG/ACT nasal spray, 2 sprays into the nostril(s) as directed by provider Daily., Disp: , Rfl:   •  amoxicillin-clavulanate (Augmentin) 875-125 MG per tablet, Take 1 tablet by mouth Every 12 (Twelve) Hours., Disp: 20 tablet, Rfl: 0  •  atorvastatin (LIPITOR) 20 MG tablet, Take 1 tablet by mouth Daily., Disp: 90 tablet, Rfl: 1  •  benzonatate (Tessalon Perles) 100 MG capsule, Take 1 capsule by mouth 3 (Three) Times a Day As Needed for Cough., Disp: 30 capsule, Rfl: 0  •  diclofenac (VOLTAREN) 1 % gel gel, Apply 4 g topically to the appropriate area as directed 4 (Four) Times a Day As Needed (neck pain)., Disp: 100 g, Rfl: 0  •  methylPREDNISolone (MEDROL) 4 MG dose pack, Take as directed on package instructions., Disp: 21 each, Rfl: 0    ROS    Review of Systems   Constitutional: Negative for chills and fever.   HENT: Positive for congestion, ear pain, postnasal drip and voice change.     Respiratory: Positive for cough.    Neurological: Positive for headache.       There were no vitals filed for this visit.  There is no height or weight on file to calculate BMI.    Physical Exam     Physical Exam  Constitutional:       General: He is not in acute distress.  HENT:      Mouth/Throat:      Comments: Hoarse voice  Pulmonary:      Effort: No respiratory distress.   Neurological:      Mental Status: He is alert.         Assessment/Plan    Problems Addressed this Visit     None      Visit Diagnoses     Cough    -  Primary    Relevant Orders    COVID-19 PCR, [facility], NP SWAB IN [facility] TRANSPORT MEDIA 24-30 HR TAT - Swab, Nasopharynx    Laryngitis        Relevant Orders    COVID-19 PCR, [facility], NP SWAB IN [facility]AL TRANSPORT MEDIA 24-30 HR TAT - Swab, Nasopharynx    Upper respiratory tract infection, unspecified type        Relevant Medications    amoxicillin-clavulanate (Augmentin) 875-125 MG per tablet    Other Relevant Orders    COVID-19 PCR, [facility], NP SWAB IN [facility] TRANSPORT MEDIA 24-30 HR TAT - Swab, Nasopharynx        Due to cough, will proceed with Covid testing.  Patient has been instructed to have this done at the front of the hospital at the drive-through testing.  Given his symptoms or not resolving and are beginning to worsen, will treat for suspected bacterial infection with Augmentin and a round of steroids.  He may take Tessalon Perles as needed for cough.    This visit has been rescheduled as a phone visit to comply with patient safety concerns in accordance with CDC recommendations. Total time of discussion was 7 minutes.        New Medications Ordered This Visit   Medications   • methylPREDNISolone (MEDROL) 4 MG dose pack     Sig: Take as directed on package instructions.     Dispense:  21 each     Refill:  0   • amoxicillin-clavulanate (Augmentin) 875-125 MG per tablet     Sig: Take 1 tablet by mouth Every 12 (Twelve) Hours.     Dispense:  20 tablet     Refill:   0   • benzonatate (Tessalon Perles) 100 MG capsule     Sig: Take 1 capsule by mouth 3 (Three) Times a Day As Needed for Cough.     Dispense:  30 capsule     Refill:  0       No orders of the defined types were placed in this encounter.      No follow-ups on file.    Yovana Sorensen DO

## 2021-02-26 NOTE — PROGRESS NOTES
You have chosen to receive care through a telephone visit. Do you consent to use a telephone visit for your medical care today? Yes    On this video today are Satish Funes and Yovana Sorensen DO

## 2021-02-27 LAB — SARS-COV-2 RNA RESP QL NAA+PROBE: NOT DETECTED

## 2021-03-01 ENCOUNTER — TELEPHONE (OUTPATIENT)
Dept: INTERNAL MEDICINE | Facility: CLINIC | Age: 46
End: 2021-03-01

## 2021-03-01 NOTE — TELEPHONE ENCOUNTER
PATIENT CALLED AND STATED THAT THE COUGH MEDICATION THAT HE WAS PRESCRIBED FOR COUGHING IS NOT WORKING. HE WOULD LIKE TO SEE IF HE CAN GET SOMETHING ELSE CALLED INTO THE PHARMACY. [facility] [address] [place] [address]       CALL BACK NUMBER: [phone]

## 2021-03-01 NOTE — TELEPHONE ENCOUNTER
Caller: Satish Funes    Relationship: Self    Best call back number: [phone]    What test was performed: COVID TEST    When was the test performed: 2/26/2021    Where was the test performed: [facility]

## 2021-03-24 RX ORDER — DULOXETIN HYDROCHLORIDE 60 MG/1
CAPSULE, DELAYED RELEASE ORAL
Qty: 90 CAPSULE | Refills: 1 | Status: SHIPPED | OUTPATIENT
Start: 2021-03-24 | End: 2021-08-19 | Stop reason: SDUPTHER

## 2021-08-19 ENCOUNTER — OFFICE VISIT (OUTPATIENT)
Dept: INTERNAL MEDICINE | Facility: CLINIC | Age: 46
End: 2021-08-19

## 2021-08-19 VITALS
RESPIRATION RATE: 16 BRPM | HEART RATE: 74 BPM | HEIGHT: 73 IN | SYSTOLIC BLOOD PRESSURE: 128 MMHG | BODY MASS INDEX: 29.95 KG/M2 | WEIGHT: 226 LBS | OXYGEN SATURATION: 99 % | DIASTOLIC BLOOD PRESSURE: 80 MMHG

## 2021-08-19 DIAGNOSIS — Z13.220 SCREENING, LIPID: ICD-10-CM

## 2021-08-19 DIAGNOSIS — R53.83 FATIGUE, UNSPECIFIED TYPE: ICD-10-CM

## 2021-08-19 DIAGNOSIS — Z13.1 SCREENING FOR DIABETES MELLITUS: ICD-10-CM

## 2021-08-19 DIAGNOSIS — J30.9 ALLERGIC RHINITIS, UNSPECIFIED SEASONALITY, UNSPECIFIED TRIGGER: Primary | ICD-10-CM

## 2021-08-19 DIAGNOSIS — F32.9 MAJOR DEPRESSIVE DISORDER, REMISSION STATUS UNSPECIFIED, UNSPECIFIED WHETHER RECURRENT: ICD-10-CM

## 2021-08-19 PROCEDURE — 99214 OFFICE O/P EST MOD 30 MIN: CPT | Performed by: FAMILY MEDICINE

## 2021-08-19 RX ORDER — LEVOCETIRIZINE DIHYDROCHLORIDE 5 MG/1
5 TABLET, FILM COATED ORAL EVERY EVENING
Qty: 90 TABLET | Refills: 3 | Status: SHIPPED | OUTPATIENT
Start: 2021-08-19 | End: 2022-02-14

## 2021-08-19 RX ORDER — CETIRIZINE HYDROCHLORIDE 10 MG/1
10 TABLET ORAL DAILY
COMMUNITY
End: 2021-08-19

## 2021-08-19 RX ORDER — ATORVASTATIN CALCIUM 20 MG/1
20 TABLET, FILM COATED ORAL DAILY
Qty: 90 TABLET | Refills: 1 | Status: SHIPPED | OUTPATIENT
Start: 2021-08-19 | End: 2022-02-14

## 2021-08-19 RX ORDER — MONTELUKAST SODIUM 10 MG/1
10 TABLET ORAL NIGHTLY
Qty: 90 TABLET | Refills: 3 | Status: SHIPPED | OUTPATIENT
Start: 2021-08-19 | End: 2022-02-14

## 2021-08-19 RX ORDER — DULOXETIN HYDROCHLORIDE 60 MG/1
60 CAPSULE, DELAYED RELEASE ORAL DAILY
Qty: 90 CAPSULE | Refills: 1 | Status: SHIPPED | OUTPATIENT
Start: 2021-08-19 | End: 2021-12-16

## 2021-08-19 RX ORDER — DIPHENHYDRAMINE HCL 25 MG
25 CAPSULE ORAL EVERY 6 HOURS PRN
COMMUNITY
End: 2023-02-26

## 2021-08-19 NOTE — PROGRESS NOTES
Satish Funes is a 45 y.o. male.    Chief Complaint   Patient presents with   • Fatigue     X 1 week, more than normal   • Headache     X 1 week, on and off. Seems better at this time       HPI   Patient presents today with worsening allergies.  He reports fatigue and headaches have been worse than usual over the last week.  He has struggled with allergic rhinitis for many years has several environmental allergens.  He is taking zyrtec or allegra in the morning and benadryl in the evening. Wakes up congested every morning.  Denies rhinorrhea. Sometimes has drainage.   Ears feel full.      Patient does have depression.  He reports cymbalta is doing well.   No feelings of hopelessness, worthlessness, nervousness, worry.     Patient also due for routine labs.    The following portions of the patient's history were reviewed and updated as appropriate: allergies, current medications, past family history, past medical history, past social history, past surgical history and problem list.     No Known Allergies      Current Outpatient Medications:   •  diphenhydrAMINE (BENADRYL) 25 mg capsule, Take 25 mg by mouth Every 6 (Six) Hours As Needed for Allergies., Disp: , Rfl:   •  DULoxetine (CYMBALTA) 60 MG capsule, Take 1 capsule by mouth Daily., Disp: 90 capsule, Rfl: 1  •  atorvastatin (LIPITOR) 20 MG tablet, Take 1 tablet by mouth Daily., Disp: 90 tablet, Rfl: 1  •  levocetirizine (Xyzal Allergy 24HR) 5 MG tablet, Take 1 tablet by mouth Every Evening., Disp: 90 tablet, Rfl: 3  •  montelukast (Singulair) 10 MG tablet, Take 1 tablet by mouth Every Night., Disp: 90 tablet, Rfl: 3    ROS    Review of Systems   Constitutional: Positive for fatigue. Negative for chills and fever.   HENT: Positive for congestion and rhinorrhea.    Respiratory: Negative for cough and shortness of breath.    Cardiovascular: Negative for chest pain.   Allergic/Immunologic: Positive for environmental allergies.   Psychiatric/Behavioral: Negative for  "depressed mood. The patient is not nervous/anxious.        Vitals:    08/19/21 1447   BP: 128/80   BP Location: Left arm   Patient Position: Sitting   Cuff Size: Adult   Pulse: 74   Resp: 16   SpO2: 99%   Weight: 103 kg (226 lb)   Height: 185.4 cm (73\")     Body mass index is 29.82 kg/m².    Physical Exam     Physical Exam  Constitutional:       General: He is not in acute distress.     Appearance: Normal appearance. He is well-developed.   HENT:      Head: Normocephalic and atraumatic.      Right Ear: External ear normal. A middle ear effusion is present.      Left Ear: External ear normal. A middle ear effusion is present.      Nose: Congestion present.   Eyes:      Extraocular Movements: Extraocular movements intact.      Conjunctiva/sclera: Conjunctivae normal.   Cardiovascular:      Rate and Rhythm: Normal rate and regular rhythm.      Heart sounds: No murmur heard.     Pulmonary:      Effort: Pulmonary effort is normal. No respiratory distress.      Breath sounds: Normal breath sounds. No wheezing.   Abdominal:      General: Bowel sounds are normal. There is no distension.      Palpations: Abdomen is soft.      Tenderness: There is no abdominal tenderness.   Musculoskeletal:      Cervical back: Neck supple.      Right lower leg: No edema.      Left lower leg: No edema.   Lymphadenopathy:      Cervical: No cervical adenopathy.   Skin:     General: Skin is warm and dry.   Neurological:      Mental Status: He is alert and oriented to person, place, and time.      Cranial Nerves: No cranial nerve deficit.   Psychiatric:         Mood and Affect: Mood normal.         Behavior: Behavior normal.         Assessment/Plan    Problems Addressed this Visit        Allergies and Adverse Reactions    Allergic rhinitis - Primary     Uncontrolled.  Patient encouraged to change antihistamines taking Xyzal.  Will add in Singulair as well.            Mental Health    Depression     Controlled on current medication.  Patient will " continue Cymbalta.         Relevant Medications    DULoxetine (CYMBALTA) 60 MG capsule      Other Visit Diagnoses     Fatigue, unspecified type        Relevant Orders    CBC & Differential    Comprehensive Metabolic Panel    TSH    Vitamin B12    Folate    T4, Free    Screening for diabetes mellitus        Relevant Orders    Hemoglobin A1c    Screening, lipid        Relevant Orders    Lipid Panel        New Medications Ordered This Visit   Medications   • atorvastatin (LIPITOR) 20 MG tablet     Sig: Take 1 tablet by mouth Daily.     Dispense:  90 tablet     Refill:  1   • DULoxetine (CYMBALTA) 60 MG capsule     Sig: Take 1 capsule by mouth Daily.     Dispense:  90 capsule     Refill:  1   • levocetirizine (Xyzal Allergy 24HR) 5 MG tablet     Sig: Take 1 tablet by mouth Every Evening.     Dispense:  90 tablet     Refill:  3   • montelukast (Singulair) 10 MG tablet     Sig: Take 1 tablet by mouth Every Night.     Dispense:  90 tablet     Refill:  3       No orders of the defined types were placed in this encounter.      Return in about 6 months (around 2/19/2022) for Annual.    Yovana Sorensen, DO

## 2021-08-20 NOTE — ASSESSMENT & PLAN NOTE
Uncontrolled.  Patient encouraged to change antihistamines taking Xyzal.  Will add in Singulair as well.

## 2021-12-16 RX ORDER — DULOXETIN HYDROCHLORIDE 60 MG/1
CAPSULE, DELAYED RELEASE ORAL
Qty: 90 CAPSULE | Refills: 1 | Status: SHIPPED | OUTPATIENT
Start: 2021-12-16 | End: 2022-08-24

## 2022-02-02 PROCEDURE — U0004 COV-19 TEST NON-CDC HGH THRU: HCPCS | Performed by: NURSE PRACTITIONER

## 2022-02-14 ENCOUNTER — TELEMEDICINE (OUTPATIENT)
Dept: INTERNAL MEDICINE | Facility: CLINIC | Age: 47
End: 2022-02-14

## 2022-02-14 VITALS — TEMPERATURE: 99 F

## 2022-02-14 DIAGNOSIS — K52.9 GASTROENTERITIS: Primary | ICD-10-CM

## 2022-02-14 PROCEDURE — 99214 OFFICE O/P EST MOD 30 MIN: CPT | Performed by: INTERNAL MEDICINE

## 2022-02-14 RX ORDER — PROMETHAZINE HYDROCHLORIDE 25 MG/1
25 TABLET ORAL EVERY 6 HOURS PRN
Qty: 20 TABLET | Refills: 1 | Status: SHIPPED | OUTPATIENT
Start: 2022-02-14 | End: 2022-11-29

## 2022-02-14 NOTE — PROGRESS NOTES
Subjective     Patient ID: Satish Funes is a 46 y.o. male. Patient is here for management of multiple medical problems.     Chief Complaint   Patient presents with   • Vomiting   • Headache     bodyaches   • Heartburn   • Fever   • Cough     History of Present Illness     Fever body aching.     headache and cough    2:30 am woke with n/v and fever with body aching.   No ab pan    + diarrhea.    No loss of smell or taste.     Light hurts head.    Headache started 4 am. Fever started to get worse.    Last covid vaccine one month ago.        The following portions of the patient's history were reviewed and updated as appropriate: allergies, current medications, past family history, past medical history, past social history, past surgical history and problem list.    Review of Systems    Current Outpatient Medications:   •  amoxicillin-clavulanate (AUGMENTIN) 875-125 MG per tablet, Take 1 tablet by mouth Every 12 (Twelve) Hours., Disp: 20 tablet, Rfl: 0  •  diphenhydrAMINE (BENADRYL) 25 mg capsule, Take 25 mg by mouth Every 6 (Six) Hours As Needed for Allergies., Disp: , Rfl:   •  DULoxetine (CYMBALTA) 60 MG capsule, TAKE 1 CAPSULE BY MOUTH EVERY DAY, Disp: 90 capsule, Rfl: 1  •  promethazine (PHENERGAN) 25 MG tablet, Take 1 tablet by mouth Every 6 (Six) Hours As Needed for Nausea or Vomiting., Disp: 20 tablet, Rfl: 1    Objective      Temperature 99 °F (37.2 °C).    Physical Exam     General Appearance:    Alert, cooperative, no distress, appears stated age   Head:     Eyes:     Ears:     Nose:    Throat:    Neck:    Back:      Lungs:      Chest wall:     Heart:     Abdomen:      Extremities:    Pulses:    Skin:    Lymph nodes:    Neurologic:       Results for orders placed or performed during the hospital encounter of 02/02/22   COVID-19 PCR, [facility], NP SWAB IN [facility] TRANSPORT MEDIA/ORAL SWISH 24-30 HR TAT - Saliva, Oral Cavity    Specimen: Oral Cavity; Saliva   Result Value Ref Range    SARS-CoV-2 PETER  Not Detected Not Detected         Assessment/Plan     Gi n/v likely ge.        Other etiologies dicussed. If better with phenergan tylenol and hydration will give time.  If worsebning rtc or er.    Unable to complete visit using a video connection to the patient. A phone visit was used to complete this visits. Total time of discussion was 15 minutes.    Diagnoses and all orders for this visit:    1. Gastroenteritis (Primary)    Other orders  -     promethazine (PHENERGAN) 25 MG tablet; Take 1 tablet by mouth Every 6 (Six) Hours As Needed for Nausea or Vomiting.  Dispense: 20 tablet; Refill: 1      No follow-ups on file.          There are no Patient Instructions on file for this visit.     Larry Zhang MD    Assessment/Plan

## 2022-03-30 ENCOUNTER — OFFICE VISIT (OUTPATIENT)
Dept: INTERNAL MEDICINE | Facility: CLINIC | Age: 47
End: 2022-03-30

## 2022-03-30 VITALS
DIASTOLIC BLOOD PRESSURE: 82 MMHG | WEIGHT: 243 LBS | SYSTOLIC BLOOD PRESSURE: 130 MMHG | HEART RATE: 88 BPM | HEIGHT: 73 IN | OXYGEN SATURATION: 97 % | TEMPERATURE: 97.2 F | BODY MASS INDEX: 32.2 KG/M2

## 2022-03-30 DIAGNOSIS — J30.9 ALLERGIC RHINITIS, UNSPECIFIED SEASONALITY, UNSPECIFIED TRIGGER: ICD-10-CM

## 2022-03-30 DIAGNOSIS — E78.2 MIXED HYPERLIPIDEMIA: ICD-10-CM

## 2022-03-30 DIAGNOSIS — J01.40 ACUTE NON-RECURRENT PANSINUSITIS: Primary | ICD-10-CM

## 2022-03-30 DIAGNOSIS — K21.9 GASTROESOPHAGEAL REFLUX DISEASE, UNSPECIFIED WHETHER ESOPHAGITIS PRESENT: ICD-10-CM

## 2022-03-30 DIAGNOSIS — F32.9 MAJOR DEPRESSIVE DISORDER, REMISSION STATUS UNSPECIFIED, UNSPECIFIED WHETHER RECURRENT: ICD-10-CM

## 2022-03-30 PROCEDURE — U0004 COV-19 TEST NON-CDC HGH THRU: HCPCS | Performed by: FAMILY MEDICINE

## 2022-03-30 PROCEDURE — 99214 OFFICE O/P EST MOD 30 MIN: CPT | Performed by: FAMILY MEDICINE

## 2022-03-30 RX ORDER — FLUTICASONE PROPIONATE 50 MCG
2 SPRAY, SUSPENSION (ML) NASAL DAILY
Qty: 18.2 ML | Refills: 0 | Status: SHIPPED | OUTPATIENT
Start: 2022-03-30 | End: 2022-04-26

## 2022-03-30 RX ORDER — AMOXICILLIN AND CLAVULANATE POTASSIUM 875; 125 MG/1; MG/1
1 TABLET, FILM COATED ORAL EVERY 12 HOURS SCHEDULED
Qty: 20 TABLET | Refills: 0 | OUTPATIENT
Start: 2022-03-30 | End: 2022-05-17

## 2022-03-30 RX ORDER — OMEPRAZOLE 20 MG/1
20 CAPSULE, DELAYED RELEASE ORAL DAILY
Qty: 90 CAPSULE | Refills: 3 | Status: SHIPPED | OUTPATIENT
Start: 2022-03-30 | End: 2023-01-19 | Stop reason: SDUPTHER

## 2022-03-30 RX ORDER — MONTELUKAST SODIUM 10 MG/1
10 TABLET ORAL NIGHTLY
Qty: 90 TABLET | Refills: 3 | Status: SHIPPED | OUTPATIENT
Start: 2022-03-30 | End: 2023-01-19 | Stop reason: SDUPTHER

## 2022-03-30 RX ORDER — LEVOCETIRIZINE DIHYDROCHLORIDE 5 MG/1
5 TABLET, FILM COATED ORAL EVERY EVENING
Qty: 90 TABLET | Refills: 3 | OUTPATIENT
Start: 2022-03-30 | End: 2023-01-17

## 2022-03-30 RX ORDER — ATORVASTATIN CALCIUM 20 MG/1
20 TABLET, FILM COATED ORAL DAILY
Qty: 90 TABLET | Refills: 3 | Status: SHIPPED | OUTPATIENT
Start: 2022-03-30 | End: 2023-01-19 | Stop reason: SDUPTHER

## 2022-03-30 NOTE — PROGRESS NOTES
Satish Fuens is a 46 y.o. male.    Chief Complaint   Patient presents with   • Ear Fullness   • Headache   • Sinus Problem   • Nasal Congestion   • Abstract     Lipitor, luis, singular. Omeprazole.        HPI   Patient has GERD.  Out of omeprazole.  Several medications were discontinued for some reason during a telehealth visit with another provider.  The pharmacy has told the patient his insurance does not cover refills and has not been dispensed refills despite still being active on the prescription.  Admits to heartburn and reflux.     Patient reports sinus congestion, drainage, headache, ear pain.  This started within the last day or 2. He has been out of singulair and xyzal as well.     Patient has hyperlipidemia.  He is supposed to be taking lipitor.  He has been out of this medication as well.  He is planning on having cholesterol labs done this morning.      In addition, patient has depression.  He has been compliant with taking cymbalta.  He denies any feelings of hopelessness, worthlessness, feeling down.      The following portions of the patient's history were reviewed and updated as appropriate: allergies, current medications, past family history, past medical history, past social history, past surgical history and problem list.     No Known Allergies      Current Outpatient Medications:   •  diphenhydrAMINE (BENADRYL) 25 mg capsule, Take 25 mg by mouth Every 6 (Six) Hours As Needed for Allergies., Disp: , Rfl:   •  DULoxetine (CYMBALTA) 60 MG capsule, TAKE 1 CAPSULE BY MOUTH EVERY DAY, Disp: 90 capsule, Rfl: 1  •  promethazine (PHENERGAN) 25 MG tablet, Take 1 tablet by mouth Every 6 (Six) Hours As Needed for Nausea or Vomiting., Disp: 20 tablet, Rfl: 1  •  amoxicillin-clavulanate (Augmentin) 875-125 MG per tablet, Take 1 tablet by mouth Every 12 (Twelve) Hours., Disp: 20 tablet, Rfl: 0  •  atorvastatin (Lipitor) 20 MG tablet, Take 1 tablet by mouth Daily., Disp: 90 tablet, Rfl: 3  •  fluticasone  "(Flonase) 50 MCG/ACT nasal spray, 2 sprays into the nostril(s) as directed by provider Daily., Disp: 18.2 mL, Rfl: 0  •  levocetirizine (Xyzal) 5 MG tablet, Take 1 tablet by mouth Every Evening., Disp: 90 tablet, Rfl: 3  •  montelukast (Singulair) 10 MG tablet, Take 1 tablet by mouth Every Night., Disp: 90 tablet, Rfl: 3  •  omeprazole (priLOSEC) 20 MG capsule, Take 1 capsule by mouth Daily., Disp: 90 capsule, Rfl: 3    ROS    Review of Systems   Constitutional: Negative for chills and fever.   HENT: Positive for congestion, ear pain, postnasal drip, rhinorrhea and sinus pressure. Negative for sore throat.    Respiratory: Positive for cough. Negative for shortness of breath.    Cardiovascular: Negative for chest pain.   Gastrointestinal: Positive for GERD. Negative for abdominal pain, diarrhea, nausea and vomiting.   Psychiatric/Behavioral: Negative for dysphoric mood. The patient is not nervous/anxious.        Vitals:    03/30/22 0830   BP: 130/82   Pulse: 88   Temp: 97.2 °F (36.2 °C)   SpO2: 97%   Weight: 110 kg (243 lb)   Height: 185.4 cm (73\")     Body mass index is 32.06 kg/m².    Physical Exam     Physical Exam  Constitutional:       General: He is not in acute distress.     Appearance: Normal appearance. He is well-developed.   HENT:      Head: Normocephalic and atraumatic.      Right Ear: External ear normal. A middle ear effusion is present.      Left Ear: External ear normal. A middle ear effusion is present.      Nose:      Right Sinus: Maxillary sinus tenderness and frontal sinus tenderness present.      Left Sinus: Maxillary sinus tenderness and frontal sinus tenderness present.      Mouth/Throat:      Pharynx: Posterior oropharyngeal erythema present.   Eyes:      Extraocular Movements: Extraocular movements intact.      Conjunctiva/sclera: Conjunctivae normal.   Cardiovascular:      Rate and Rhythm: Normal rate and regular rhythm.      Heart sounds: No murmur heard.  Pulmonary:      Effort: Pulmonary " effort is normal. No respiratory distress.      Breath sounds: Normal breath sounds. No wheezing.   Abdominal:      General: Bowel sounds are normal. There is no distension.      Palpations: Abdomen is soft.      Tenderness: There is no abdominal tenderness.   Musculoskeletal:      Cervical back: Neck supple.      Right lower leg: No edema.      Left lower leg: No edema.   Lymphadenopathy:      Cervical: No cervical adenopathy.   Skin:     General: Skin is warm and dry.   Neurological:      Mental Status: He is alert and oriented to person, place, and time.      Cranial Nerves: No cranial nerve deficit.   Psychiatric:         Mood and Affect: Mood normal.         Behavior: Behavior normal.         Assessment/Plan    Problems Addressed this Visit     Hyperlipidemia     Likely uncontrolled due to being out of the medication.  Will have labs drawn today.  Lipitor has been refilled today.            Relevant Medications    atorvastatin (Lipitor) 20 MG tablet    Gastroesophageal reflux disease     Uncontrolled due to not having medication.  Prilosec refilled.            Relevant Medications    omeprazole (priLOSEC) 20 MG capsule    Depression     Controlled on current medication.  Patient will continue Cymbalta.           Allergic rhinitis     Uncontrolled due to not having medication, resulting in sinus infection.  Will refill xyzal and singulair.             Acute non-recurrent pansinusitis - Primary     Will treat with a course of augmentin and flonase.  Start back on routine allergy medication.             Relevant Orders    COVID-19 PCR, [facility], NP SWAB IN [facility]AL TRANSPORT MEDIA/ORAL SWISH 24-30 HR TAT - Swab, Nasopharynx        New Medications Ordered This Visit   Medications   • levocetirizine (Xyzal) 5 MG tablet     Sig: Take 1 tablet by mouth Every Evening.     Dispense:  90 tablet     Refill:  3   • omeprazole (priLOSEC) 20 MG capsule     Sig: Take 1 capsule by mouth Daily.     Dispense:  90 capsule      Refill:  3   • atorvastatin (Lipitor) 20 MG tablet     Sig: Take 1 tablet by mouth Daily.     Dispense:  90 tablet     Refill:  3   • montelukast (Singulair) 10 MG tablet     Sig: Take 1 tablet by mouth Every Night.     Dispense:  90 tablet     Refill:  3   • amoxicillin-clavulanate (Augmentin) 875-125 MG per tablet     Sig: Take 1 tablet by mouth Every 12 (Twelve) Hours.     Dispense:  20 tablet     Refill:  0   • fluticasone (Flonase) 50 MCG/ACT nasal spray     Si sprays into the nostril(s) as directed by provider Daily.     Dispense:  18.2 mL     Refill:  0       No orders of the defined types were placed in this encounter.      Return in about 3 months (around 2022) for Annual.    Yovana Sorensen, DO

## 2022-03-30 NOTE — ASSESSMENT & PLAN NOTE
Likely uncontrolled due to being out of the medication.  Will have labs drawn today.  Lipitor has been refilled today.

## 2022-03-30 NOTE — ASSESSMENT & PLAN NOTE
Uncontrolled due to not having medication, resulting in sinus infection.  Will refill xyzal and singulair.

## 2022-03-31 LAB
ALBUMIN SERPL-MCNC: 4.6 G/DL (ref 3.5–5.2)
ALBUMIN/GLOB SERPL: 2.1 G/DL
ALP SERPL-CCNC: 123 U/L (ref 39–117)
ALT SERPL-CCNC: 13 U/L (ref 1–41)
AST SERPL-CCNC: 15 U/L (ref 1–40)
BASOPHILS # BLD AUTO: 0.05 10*3/MM3 (ref 0–0.2)
BASOPHILS NFR BLD AUTO: 0.9 % (ref 0–1.5)
BILIRUB SERPL-MCNC: 0.6 MG/DL (ref 0–1.2)
BUN SERPL-MCNC: 8 MG/DL (ref 6–20)
BUN/CREAT SERPL: 9.6 (ref 7–25)
CALCIUM SERPL-MCNC: 9.5 MG/DL (ref 8.6–10.5)
CHLORIDE SERPL-SCNC: 104 MMOL/L (ref 98–107)
CHOLEST SERPL-MCNC: 266 MG/DL (ref 0–200)
CO2 SERPL-SCNC: 28.8 MMOL/L (ref 22–29)
CREAT SERPL-MCNC: 0.83 MG/DL (ref 0.76–1.27)
EGFRCR SERPLBLD CKD-EPI 2021: 109.3 ML/MIN/1.73
EOSINOPHIL # BLD AUTO: 0.13 10*3/MM3 (ref 0–0.4)
EOSINOPHIL NFR BLD AUTO: 2.3 % (ref 0.3–6.2)
ERYTHROCYTE [DISTWIDTH] IN BLOOD BY AUTOMATED COUNT: 12.6 % (ref 12.3–15.4)
FOLATE SERPL-MCNC: 2.92 NG/ML (ref 4.78–24.2)
GLOBULIN SER CALC-MCNC: 2.2 GM/DL
GLUCOSE SERPL-MCNC: 100 MG/DL (ref 65–99)
HBA1C MFR BLD: 5.7 % (ref 4.8–5.6)
HCT VFR BLD AUTO: 45.3 % (ref 37.5–51)
HDLC SERPL-MCNC: 44 MG/DL (ref 40–60)
HGB BLD-MCNC: 15.3 G/DL (ref 13–17.7)
IMM GRANULOCYTES # BLD AUTO: 0.01 10*3/MM3 (ref 0–0.05)
IMM GRANULOCYTES NFR BLD AUTO: 0.2 % (ref 0–0.5)
LDLC SERPL CALC-MCNC: 208 MG/DL (ref 0–100)
LYMPHOCYTES # BLD AUTO: 1.16 10*3/MM3 (ref 0.7–3.1)
LYMPHOCYTES NFR BLD AUTO: 20.4 % (ref 19.6–45.3)
MCH RBC QN AUTO: 30.8 PG (ref 26.6–33)
MCHC RBC AUTO-ENTMCNC: 33.8 G/DL (ref 31.5–35.7)
MCV RBC AUTO: 91.3 FL (ref 79–97)
MONOCYTES # BLD AUTO: 0.7 10*3/MM3 (ref 0.1–0.9)
MONOCYTES NFR BLD AUTO: 12.3 % (ref 5–12)
NEUTROPHILS # BLD AUTO: 3.65 10*3/MM3 (ref 1.7–7)
NEUTROPHILS NFR BLD AUTO: 63.9 % (ref 42.7–76)
NRBC BLD AUTO-RTO: 0 /100 WBC (ref 0–0.2)
PLATELET # BLD AUTO: 249 10*3/MM3 (ref 140–450)
POTASSIUM SERPL-SCNC: 4.8 MMOL/L (ref 3.5–5.2)
PROT SERPL-MCNC: 6.8 G/DL (ref 6–8.5)
RBC # BLD AUTO: 4.96 10*6/MM3 (ref 4.14–5.8)
SARS-COV-2 RNA NOSE QL NAA+PROBE: NOT DETECTED
SODIUM SERPL-SCNC: 141 MMOL/L (ref 136–145)
T4 FREE SERPL-MCNC: 1.05 NG/DL (ref 0.93–1.7)
TRIGL SERPL-MCNC: 82 MG/DL (ref 0–150)
TSH SERPL DL<=0.005 MIU/L-ACNC: 1.86 UIU/ML (ref 0.27–4.2)
VIT B12 SERPL-MCNC: 246 PG/ML (ref 211–946)
VLDLC SERPL CALC-MCNC: 14 MG/DL (ref 5–40)
WBC # BLD AUTO: 5.7 10*3/MM3 (ref 3.4–10.8)

## 2022-04-26 RX ORDER — FLUTICASONE PROPIONATE 50 MCG
SPRAY, SUSPENSION (ML) NASAL
Qty: 16 G | Refills: 3 | Status: SHIPPED | OUTPATIENT
Start: 2022-04-26 | End: 2022-11-29

## 2022-08-24 RX ORDER — DULOXETIN HYDROCHLORIDE 60 MG/1
CAPSULE, DELAYED RELEASE ORAL
Qty: 90 CAPSULE | Refills: 1 | Status: SHIPPED | OUTPATIENT
Start: 2022-08-24 | End: 2023-01-19 | Stop reason: SDUPTHER

## 2022-08-24 NOTE — TELEPHONE ENCOUNTER
Rx Refill Note  Requested Prescriptions     Pending Prescriptions Disp Refills   • DULoxetine (CYMBALTA) 60 MG capsule [Pharmacy Med Name: DULOXETINE DR 60MG CAPSULES] 90 capsule 1     Sig: TAKE 1 CAPSULE BY MOUTH DAILY      Last office visit with prescribing clinician: 3/30/2022      Next office visit with prescribing clinician: Visit date not found            Dillon Avila MA  08/24/22, 15:35 EDT

## 2023-01-19 ENCOUNTER — OFFICE VISIT (OUTPATIENT)
Dept: INTERNAL MEDICINE | Facility: CLINIC | Age: 48
End: 2023-01-19
Payer: COMMERCIAL

## 2023-01-19 VITALS
WEIGHT: 224 LBS | TEMPERATURE: 98 F | HEART RATE: 78 BPM | SYSTOLIC BLOOD PRESSURE: 120 MMHG | OXYGEN SATURATION: 97 % | HEIGHT: 73 IN | DIASTOLIC BLOOD PRESSURE: 80 MMHG | BODY MASS INDEX: 29.69 KG/M2

## 2023-01-19 DIAGNOSIS — F32.9 MAJOR DEPRESSIVE DISORDER, REMISSION STATUS UNSPECIFIED, UNSPECIFIED WHETHER RECURRENT: ICD-10-CM

## 2023-01-19 DIAGNOSIS — R53.83 FATIGUE, UNSPECIFIED TYPE: ICD-10-CM

## 2023-01-19 DIAGNOSIS — K21.9 GASTROESOPHAGEAL REFLUX DISEASE, UNSPECIFIED WHETHER ESOPHAGITIS PRESENT: ICD-10-CM

## 2023-01-19 DIAGNOSIS — Z13.1 SCREENING FOR DIABETES MELLITUS: ICD-10-CM

## 2023-01-19 DIAGNOSIS — E53.8 FOLIC ACID DEFICIENCY: ICD-10-CM

## 2023-01-19 DIAGNOSIS — R73.9 HYPERGLYCEMIA: ICD-10-CM

## 2023-01-19 DIAGNOSIS — E78.2 MIXED HYPERLIPIDEMIA: Primary | ICD-10-CM

## 2023-01-19 DIAGNOSIS — J30.9 ALLERGIC RHINITIS, UNSPECIFIED SEASONALITY, UNSPECIFIED TRIGGER: ICD-10-CM

## 2023-01-19 DIAGNOSIS — Z13.0 SCREENING FOR BLOOD DISEASE: ICD-10-CM

## 2023-01-19 PROCEDURE — 99214 OFFICE O/P EST MOD 30 MIN: CPT | Performed by: FAMILY MEDICINE

## 2023-01-19 RX ORDER — DULOXETIN HYDROCHLORIDE 60 MG/1
60 CAPSULE, DELAYED RELEASE ORAL DAILY
Qty: 90 CAPSULE | Refills: 3 | Status: SHIPPED | OUTPATIENT
Start: 2023-01-19

## 2023-01-19 RX ORDER — MONTELUKAST SODIUM 10 MG/1
10 TABLET ORAL NIGHTLY
Qty: 90 TABLET | Refills: 3 | OUTPATIENT
Start: 2023-01-19 | End: 2023-02-26

## 2023-01-19 RX ORDER — ATORVASTATIN CALCIUM 20 MG/1
20 TABLET, FILM COATED ORAL DAILY
Qty: 90 TABLET | Refills: 3 | Status: SHIPPED | OUTPATIENT
Start: 2023-01-19

## 2023-01-19 RX ORDER — LEVOCETIRIZINE DIHYDROCHLORIDE 5 MG/1
5 TABLET, FILM COATED ORAL EVERY EVENING
Qty: 90 TABLET | Refills: 3 | OUTPATIENT
Start: 2023-01-19 | End: 2023-02-26

## 2023-01-19 RX ORDER — OMEPRAZOLE 20 MG/1
20 CAPSULE, DELAYED RELEASE ORAL DAILY
Qty: 90 CAPSULE | Refills: 3 | Status: SHIPPED | OUTPATIENT
Start: 2023-01-19

## 2023-01-19 NOTE — ASSESSMENT & PLAN NOTE
Likely uncontrolled due to noncompliance with medication.   Refills have been provided for Lipitor.   We will obtain an updated lipid panel.

## 2023-01-19 NOTE — PROGRESS NOTES
Satish Funes is a 47 y.o. male.    Chief Complaint   Patient presents with   • Hyperlipidemia   • Depression   • Heartburn       HPI   Satish Funes is a 47-year-old male who presents today for a follow-up of hyperlipidemia, depression and gastroesophageal reflux disease.    Patient has had hyperlipidemia for few years. He has not been compliant with a low fat diet and reports he stopped taking Lipitor approximately 2-3 months ago. His weight remains unchanged compared to last visit.     The patient's A1c was mildly elevated in 03/2022 at 5.7 percent.     Mr. Funes admits to symptoms of acid reflux approximately 1.5 weeks ago that has since resolved since he began taking Prilosec. He shares that he was originally concerned that it may be cardiac related due to his family history of cardiac disease. He was evaluated in the emergency room in 2018 for chest pain and completed a cardiac stress test in which he believes was normal. He is unsure if he seen cardiology for a follow-up after being discharged home. He denies any current chest pain.     He has depression as weell.  His symptoms are well-controlled on Cymbalta. He denies any depression symptoms.     He does admit to an increase in fatigue recently and is unsure if it is secondary to his allergy symptoms. He denies any trouble falling asleep but does wake up multiple times per night and will sometimes have trouble falling back asleep. His last vitamin B12 and folic acid levels were borderline low.    He is taking Zyrtec and Benadryl for his allergies. He states while receiving a hair cut earlier this week a dog jumped on his lap and since that time he has had difficulty breathing due to an increase in sinus drainage and congestion. He adds that he has also been off of Singulair and has not requested a new refill.    The following portions of the patient's history were reviewed and updated as appropriate: allergies, current medications, past family history, past  "medical history, past social history, past surgical history and problem list.     No Known Allergies      Current Outpatient Medications:   •  atorvastatin (Lipitor) 20 MG tablet, Take 1 tablet by mouth Daily., Disp: 90 tablet, Rfl: 3  •  diphenhydrAMINE (BENADRYL) 25 mg capsule, Take 25 mg by mouth Every 6 (Six) Hours As Needed for Allergies., Disp: , Rfl:   •  DULoxetine (CYMBALTA) 60 MG capsule, Take 1 capsule by mouth Daily., Disp: 90 capsule, Rfl: 3  •  montelukast (Singulair) 10 MG tablet, Take 1 tablet by mouth Every Night., Disp: 90 tablet, Rfl: 3  •  omeprazole (priLOSEC) 20 MG capsule, Take 1 capsule by mouth Daily., Disp: 90 capsule, Rfl: 3  •  levocetirizine (XYZAL) 5 MG tablet, Take 1 tablet by mouth Every Evening., Disp: 90 tablet, Rfl: 3    ROS    Review of Systems   Constitutional: Positive for fatigue. Negative for chills and fever.   HENT: Positive for congestion.    Respiratory: Negative for shortness of breath.    Cardiovascular: Negative for chest pain and leg swelling.   Gastrointestinal: Negative for abdominal pain, constipation, diarrhea, nausea and vomiting.   Endocrine: Negative for cold intolerance and heat intolerance.   Musculoskeletal: Negative for arthralgias and back pain.   Skin: Negative for color change and rash.   Allergic/Immunologic: Positive for environmental allergies.   Psychiatric/Behavioral: Negative for depressed mood. The patient is not nervous/anxious.        Vitals:    01/19/23 1528   BP: 120/80   BP Location: Left arm   Patient Position: Sitting   Cuff Size: Adult   Pulse: 78   Temp: 98 °F (36.7 °C)   SpO2: 97%   Weight: 102 kg (224 lb)   Height: 185.4 cm (73\")   PainSc: 0-No pain     Body mass index is 29.55 kg/m².    Physical Exam     Physical Exam  Constitutional:       General: He is not in acute distress.     Appearance: Normal appearance. He is well-developed.   HENT:      Head: Normocephalic and atraumatic.      Right Ear: External ear normal.      Left Ear: " External ear normal.   Eyes:      Extraocular Movements: Extraocular movements intact.      Conjunctiva/sclera: Conjunctivae normal.      Pupils: Pupils are equal, round, and reactive to light.   Cardiovascular:      Rate and Rhythm: Normal rate and regular rhythm.      Heart sounds: No murmur heard.  Pulmonary:      Effort: Pulmonary effort is normal. No respiratory distress.      Breath sounds: Normal breath sounds. No wheezing.   Abdominal:      General: Bowel sounds are normal. There is no distension.      Palpations: Abdomen is soft.      Tenderness: There is no abdominal tenderness.   Musculoskeletal:      Right lower leg: No edema.      Left lower leg: No edema.   Skin:     General: Skin is warm and dry.   Neurological:      Mental Status: He is alert and oriented to person, place, and time.      Cranial Nerves: No cranial nerve deficit.   Psychiatric:         Mood and Affect: Mood normal.         Behavior: Behavior normal.         Assessment/Plan    Diagnoses and all orders for this visit:    1. Mixed hyperlipidemia (Primary)  Assessment & Plan:  Likely uncontrolled due to noncompliance with medication.   Refills have been provided for Lipitor.   We will obtain an updated lipid panel.     Orders:  -     Lipid Panel    2. Gastroesophageal reflux disease, unspecified whether esophagitis present  Assessment & Plan:  Controlled with Prilosec. He will continue current medication.      3. Major depressive disorder, remission status unspecified, unspecified whether recurrent  Assessment & Plan:  Stable on current medication. He will continue Cymbalta.      4. Screening for diabetes mellitus  -     Comprehensive Metabolic Panel  -     Hemoglobin A1c    5. Screening for blood disease  -     CBC & Differential    6. Fatigue, unspecified type  -     TSH  -     Testosterone, Free, Total  -     T4, Free  -     Vitamin B12  -     Folate    7. Hyperglycemia  -     Hemoglobin A1c    8. Folic acid deficiency  -      Folate    9. Allergic rhinitis, unspecified seasonality, unspecified trigger  Assessment & Plan:  Uncontrolled due to recent exposure to dog hair. Refills have been provided today for Singulair and Xyzal.     Orders:  -     Ambulatory Referral to Allergy    Other orders  -     atorvastatin (Lipitor) 20 MG tablet; Take 1 tablet by mouth Daily.  Dispense: 90 tablet; Refill: 3  -     DULoxetine (CYMBALTA) 60 MG capsule; Take 1 capsule by mouth Daily.  Dispense: 90 capsule; Refill: 3  -     omeprazole (priLOSEC) 20 MG capsule; Take 1 capsule by mouth Daily.  Dispense: 90 capsule; Refill: 3  -     montelukast (Singulair) 10 MG tablet; Take 1 tablet by mouth Every Night.  Dispense: 90 tablet; Refill: 3  -     levocetirizine (XYZAL) 5 MG tablet; Take 1 tablet by mouth Every Evening.  Dispense: 90 tablet; Refill: 3      New Medications Ordered This Visit   Medications   • atorvastatin (Lipitor) 20 MG tablet     Sig: Take 1 tablet by mouth Daily.     Dispense:  90 tablet     Refill:  3   • DULoxetine (CYMBALTA) 60 MG capsule     Sig: Take 1 capsule by mouth Daily.     Dispense:  90 capsule     Refill:  3   • omeprazole (priLOSEC) 20 MG capsule     Sig: Take 1 capsule by mouth Daily.     Dispense:  90 capsule     Refill:  3   • montelukast (Singulair) 10 MG tablet     Sig: Take 1 tablet by mouth Every Night.     Dispense:  90 tablet     Refill:  3   • levocetirizine (XYZAL) 5 MG tablet     Sig: Take 1 tablet by mouth Every Evening.     Dispense:  90 tablet     Refill:  3       No orders of the defined types were placed in this encounter.      Return in about 6 months (around 7/19/2023) for HPL, GERD.    Yovana Sorensen DO     Transcribed from ambient dictation for Yovana Sorensen DO by Milli Pozo.  01/19/23   16:23 EST    Patient or patient representative verbalized consent to the visit recording.  I have personally performed the services described in this document as transcribed by the above individual, and it is both  accurate and complete.  Yovana Sorensen,   1/19/2023  21:47 EST

## 2023-01-19 NOTE — ASSESSMENT & PLAN NOTE
Uncontrolled due to recent exposure to dog hair. Refills have been provided today for Singulair and Xyzal.

## 2023-01-22 LAB
ALBUMIN SERPL-MCNC: 4.3 G/DL (ref 3.5–5.2)
ALBUMIN/GLOB SERPL: 1.8 G/DL
ALP SERPL-CCNC: 119 U/L (ref 39–117)
ALT SERPL-CCNC: 13 U/L (ref 1–41)
AST SERPL-CCNC: 17 U/L (ref 1–40)
BASOPHILS # BLD AUTO: 0.04 10*3/MM3 (ref 0–0.2)
BASOPHILS NFR BLD AUTO: 0.6 % (ref 0–1.5)
BILIRUB SERPL-MCNC: 0.4 MG/DL (ref 0–1.2)
BUN SERPL-MCNC: 11 MG/DL (ref 6–20)
BUN/CREAT SERPL: 12 (ref 7–25)
CALCIUM SERPL-MCNC: 9.3 MG/DL (ref 8.6–10.5)
CHLORIDE SERPL-SCNC: 104 MMOL/L (ref 98–107)
CHOLEST SERPL-MCNC: 250 MG/DL (ref 0–200)
CO2 SERPL-SCNC: 28.2 MMOL/L (ref 22–29)
CREAT SERPL-MCNC: 0.92 MG/DL (ref 0.76–1.27)
EGFRCR SERPLBLD CKD-EPI 2021: 103.2 ML/MIN/1.73
EOSINOPHIL # BLD AUTO: 0.14 10*3/MM3 (ref 0–0.4)
EOSINOPHIL NFR BLD AUTO: 2 % (ref 0.3–6.2)
ERYTHROCYTE [DISTWIDTH] IN BLOOD BY AUTOMATED COUNT: 12 % (ref 12.3–15.4)
FOLATE SERPL-MCNC: 9.66 NG/ML (ref 4.78–24.2)
GLOBULIN SER CALC-MCNC: 2.4 GM/DL
GLUCOSE SERPL-MCNC: 106 MG/DL (ref 65–99)
HBA1C MFR BLD: 5.7 % (ref 4.8–5.6)
HCT VFR BLD AUTO: 43.5 % (ref 37.5–51)
HDLC SERPL-MCNC: 43 MG/DL (ref 40–60)
HGB BLD-MCNC: 14.8 G/DL (ref 13–17.7)
IMM GRANULOCYTES # BLD AUTO: 0.02 10*3/MM3 (ref 0–0.05)
IMM GRANULOCYTES NFR BLD AUTO: 0.3 % (ref 0–0.5)
LDLC SERPL CALC-MCNC: 193 MG/DL (ref 0–100)
LYMPHOCYTES # BLD AUTO: 1.47 10*3/MM3 (ref 0.7–3.1)
LYMPHOCYTES NFR BLD AUTO: 21.2 % (ref 19.6–45.3)
MCH RBC QN AUTO: 30.7 PG (ref 26.6–33)
MCHC RBC AUTO-ENTMCNC: 34 G/DL (ref 31.5–35.7)
MCV RBC AUTO: 90.2 FL (ref 79–97)
MONOCYTES # BLD AUTO: 0.76 10*3/MM3 (ref 0.1–0.9)
MONOCYTES NFR BLD AUTO: 11 % (ref 5–12)
NEUTROPHILS # BLD AUTO: 4.51 10*3/MM3 (ref 1.7–7)
NEUTROPHILS NFR BLD AUTO: 64.9 % (ref 42.7–76)
NRBC BLD AUTO-RTO: 0 /100 WBC (ref 0–0.2)
PLATELET # BLD AUTO: 239 10*3/MM3 (ref 140–450)
POTASSIUM SERPL-SCNC: 4.3 MMOL/L (ref 3.5–5.2)
PROT SERPL-MCNC: 6.7 G/DL (ref 6–8.5)
RBC # BLD AUTO: 4.82 10*6/MM3 (ref 4.14–5.8)
SODIUM SERPL-SCNC: 141 MMOL/L (ref 136–145)
T4 FREE SERPL-MCNC: 1.04 NG/DL (ref 0.93–1.7)
TESTOST FREE SERPL-MCNC: 8.9 PG/ML (ref 6.8–21.5)
TESTOST SERPL-MCNC: 487 NG/DL (ref 264–916)
TRIGL SERPL-MCNC: 83 MG/DL (ref 0–150)
TSH SERPL DL<=0.005 MIU/L-ACNC: 1.94 UIU/ML (ref 0.27–4.2)
VIT B12 SERPL-MCNC: 315 PG/ML (ref 211–946)
VLDLC SERPL CALC-MCNC: 14 MG/DL (ref 5–40)
WBC # BLD AUTO: 6.94 10*3/MM3 (ref 3.4–10.8)

## 2023-03-02 ENCOUNTER — TELEPHONE (OUTPATIENT)
Dept: INTERNAL MEDICINE | Facility: CLINIC | Age: 48
End: 2023-03-02

## 2023-03-02 NOTE — TELEPHONE ENCOUNTER
Caller: Satish Funes    Relationship: Self    Best call back number:    [phone]          What is the medical concern/diagnosis: ALLERGY SYMPTOMS    What specialty or service is being requested: ENT  What is the provider, practice or medical service name: ANY    What is the office location: [place]    What is the office phone number: UNKNOWN  Any additional details: CALL WITH APPOINTMENT INFORMATION

## 2023-05-21 ENCOUNTER — TELEPHONE (OUTPATIENT)
Dept: URGENT CARE | Facility: CLINIC | Age: 48
End: 2023-05-21
Payer: COMMERCIAL

## 2023-05-21 DIAGNOSIS — J22 LOWER RESPIRATORY INFECTION: Primary | ICD-10-CM

## 2023-05-21 RX ORDER — AMOXICILLIN AND CLAVULANATE POTASSIUM 875; 125 MG/1; MG/1
1 TABLET, FILM COATED ORAL EVERY 12 HOURS SCHEDULED
Qty: 8 TABLET | Refills: 0 | Status: SHIPPED | OUTPATIENT
Start: 2023-05-21 | End: 2023-05-25

## 2023-06-16 ENCOUNTER — OFFICE VISIT (OUTPATIENT)
Dept: INTERNAL MEDICINE | Facility: CLINIC | Age: 48
End: 2023-06-16
Payer: COMMERCIAL

## 2023-06-16 VITALS
WEIGHT: 220 LBS | HEART RATE: 71 BPM | DIASTOLIC BLOOD PRESSURE: 87 MMHG | BODY MASS INDEX: 29.16 KG/M2 | HEIGHT: 73 IN | OXYGEN SATURATION: 99 % | RESPIRATION RATE: 16 BRPM | TEMPERATURE: 97.9 F | SYSTOLIC BLOOD PRESSURE: 130 MMHG

## 2023-06-16 DIAGNOSIS — R05.3 CHRONIC COUGH: Primary | ICD-10-CM

## 2023-06-16 PROCEDURE — 99213 OFFICE O/P EST LOW 20 MIN: CPT | Performed by: FAMILY MEDICINE

## 2023-06-16 RX ORDER — ALBUTEROL SULFATE 90 UG/1
2 AEROSOL, METERED RESPIRATORY (INHALATION) EVERY 4 HOURS PRN
Qty: 18 G | Refills: 0 | Status: SHIPPED | OUTPATIENT
Start: 2023-06-16

## 2023-06-16 RX ORDER — FLUTICASONE FUROATE AND VILANTEROL 100; 25 UG/1; UG/1
1 POWDER RESPIRATORY (INHALATION)
Qty: 60 EACH | Refills: 1 | Status: SHIPPED | OUTPATIENT
Start: 2023-06-16

## 2023-06-16 NOTE — PROGRESS NOTES
Satish Funes is a 47 y.o. male.    Chief Complaint   Patient presents with    Cough     For about 2 months gets better with inhaler       HPI     Satish Funes is a 47-year-old male with date of birth 1975. He presents today with a cough for the last 2 months.     The patient is doing well. His cough resolves when he uses his inhaler. He denies having allergies. He recently had allergy testing and tested negative for all. He confrims being off all medications for 3 weeks prior to being tested. He is scheduled to have a CT scan. He was taking prednisone for the rash, and when he had the influenza. He has been off prednisone for the last 2 months. He reports always being congested and having postnasal drip. He is seeing Dr. Snider and he gave him Nasacort. He has not noticed much of a difference since starting this, but he has been having increased headaches. His blood pressure will be elevated when he has taken Zyrtec-D. The patient has also been taking albuterol inhaler and Trelegy. He has not been tested for asthma in the past. He reports taking allergy medications for the last 20-years.     The patient will occasionally have acid reflux. He is able to resolve his symptoms with taking omeprazole.     In his maternal family history there is mainly heart issues. His father was never diagnosed with COPD, but he did smoke a lot. His mother did not smoke, but she did need breathing treatments.     The following portions of the patient's history were reviewed and updated as appropriate: allergies, current medications, past family history, past medical history, past social history, past surgical history and problem list.     Allergies   Allergen Reactions    Doxycycline Rash         Current Outpatient Medications:     albuterol sulfate  (90 Base) MCG/ACT inhaler, Inhale 2 puffs Every 4 (Four) Hours As Needed for Wheezing., Disp: 18 g, Rfl: 0    atorvastatin (Lipitor) 20 MG tablet, Take 1 tablet by mouth  "Daily., Disp: 90 tablet, Rfl: 3    DULoxetine (CYMBALTA) 60 MG capsule, Take 1 capsule by mouth Daily., Disp: 90 capsule, Rfl: 3    gabapentin (NEURONTIN) 300 MG capsule, Take 1 capsule by mouth 3 (Three) Times a Day., Disp: , Rfl:     omeprazole (priLOSEC) 20 MG capsule, Take 1 capsule by mouth Daily., Disp: 90 capsule, Rfl: 3    Fluticasone Furoate-Vilanterol (Breo Ellipta) 100-25 MCG/ACT aerosol powder , Inhale 1 puff Daily., Disp: 60 each, Rfl: 1    ROS    Review of Systems   Constitutional:  Negative for chills, fatigue and fever.   HENT:  Positive for congestion and postnasal drip. Negative for sore throat.    Respiratory:  Positive for cough. Negative for shortness of breath and wheezing.    Cardiovascular:  Negative for chest pain and leg swelling.   Gastrointestinal:  Positive for GERD. Negative for abdominal pain, constipation, diarrhea, nausea and vomiting.   Hematological:  Does not bruise/bleed easily.     Vitals:    06/16/23 1027   BP: 130/87   Pulse: 71   Resp: 16   Temp: 97.9 °F (36.6 °C)   SpO2: 99%   Weight: 99.8 kg (220 lb)   Height: 185.4 cm (72.99\")     Body mass index is 29.03 kg/m².    Physical Exam     Physical Exam  Constitutional:       General: He is not in acute distress.     Appearance: Normal appearance. He is well-developed.   HENT:      Head: Normocephalic and atraumatic.      Right Ear: External ear normal.      Left Ear: External ear normal.      Mouth/Throat:      Pharynx: Posterior oropharyngeal erythema present.   Eyes:      Extraocular Movements: Extraocular movements intact.      Conjunctiva/sclera: Conjunctivae normal.   Cardiovascular:      Rate and Rhythm: Normal rate and regular rhythm.      Heart sounds: No murmur heard.  Pulmonary:      Effort: Pulmonary effort is normal. No respiratory distress.      Breath sounds: Normal breath sounds. No wheezing.   Abdominal:      General: Bowel sounds are normal. There is no distension.      Palpations: Abdomen is soft.      " Tenderness: There is no abdominal tenderness.   Musculoskeletal:      Right lower leg: No edema.      Left lower leg: No edema.   Skin:     General: Skin is warm and dry.   Neurological:      Mental Status: He is alert and oriented to person, place, and time.      Cranial Nerves: No cranial nerve deficit.   Psychiatric:         Mood and Affect: Mood normal.         Behavior: Behavior normal.       Assessment/Plan    Diagnoses and all orders for this visit:    1. Chronic cough (Primary)  Assessment & Plan:  We will obtain pulmonary function testing for further evaluation. He is being treated as suspected asthma with Breo and as needed albuterol.     Orders:  -     Pulmonary Function Test    Other orders  -     Fluticasone Furoate-Vilanterol (Breo Ellipta) 100-25 MCG/ACT aerosol powder ; Inhale 1 puff Daily.  Dispense: 60 each; Refill: 1  -     albuterol sulfate  (90 Base) MCG/ACT inhaler; Inhale 2 puffs Every 4 (Four) Hours As Needed for Wheezing.  Dispense: 18 g; Refill: 0        New Medications Ordered This Visit   Medications    Fluticasone Furoate-Vilanterol (Breo Ellipta) 100-25 MCG/ACT aerosol powder      Sig: Inhale 1 puff Daily.     Dispense:  60 each     Refill:  1    albuterol sulfate  (90 Base) MCG/ACT inhaler     Sig: Inhale 2 puffs Every 4 (Four) Hours As Needed for Wheezing.     Dispense:  18 g     Refill:  0     Proair, Ventolin, or Proventil--whatever brand insurance will cover       No orders of the defined types were placed in this encounter.      Return in about 3 months (around 9/16/2023) for Annual.    Yovana Sorensen DO     Transcribed from ambient dictation for Yovana Sorensen DO by Olivia Hubbard.  06/16/23   14:47 EDT    Patient or patient representative verbalized consent to the visit recording.  I have personally performed the services described in this document as transcribed by the above individual, and it is both accurate and complete.  Yovana Sorensen DO  6/16/2023   18:47 EDT

## 2023-06-16 NOTE — ASSESSMENT & PLAN NOTE
We will obtain pulmonary function testing for further evaluation. He is being treated as suspected asthma with Breo and as needed albuterol.

## 2023-09-22 ENCOUNTER — OFFICE VISIT (OUTPATIENT)
Dept: INTERNAL MEDICINE | Facility: CLINIC | Age: 48
End: 2023-09-22
Payer: COMMERCIAL

## 2023-09-22 VITALS
HEIGHT: 73 IN | OXYGEN SATURATION: 98 % | TEMPERATURE: 98 F | SYSTOLIC BLOOD PRESSURE: 122 MMHG | BODY MASS INDEX: 29.95 KG/M2 | DIASTOLIC BLOOD PRESSURE: 82 MMHG | HEART RATE: 74 BPM | WEIGHT: 226 LBS

## 2023-09-22 DIAGNOSIS — Z00.00 WELL ADULT EXAM: Primary | ICD-10-CM

## 2023-09-22 DIAGNOSIS — N52.9 ERECTILE DYSFUNCTION, UNSPECIFIED ERECTILE DYSFUNCTION TYPE: ICD-10-CM

## 2023-09-22 DIAGNOSIS — E78.2 MIXED HYPERLIPIDEMIA: ICD-10-CM

## 2023-09-22 DIAGNOSIS — R09.81 NASAL CONGESTION: ICD-10-CM

## 2023-09-22 DIAGNOSIS — F32.9 MAJOR DEPRESSIVE DISORDER, REMISSION STATUS UNSPECIFIED, UNSPECIFIED WHETHER RECURRENT: ICD-10-CM

## 2023-09-22 DIAGNOSIS — R73.9 HYPERGLYCEMIA: ICD-10-CM

## 2023-09-22 DIAGNOSIS — Z12.12 ENCOUNTER FOR COLORECTAL CANCER SCREENING: ICD-10-CM

## 2023-09-22 DIAGNOSIS — Z13.0 SCREENING FOR BLOOD DISEASE: ICD-10-CM

## 2023-09-22 DIAGNOSIS — Z12.11 ENCOUNTER FOR COLORECTAL CANCER SCREENING: ICD-10-CM

## 2023-09-22 DIAGNOSIS — Z13.1 SCREENING FOR DIABETES MELLITUS: ICD-10-CM

## 2023-09-22 PROBLEM — J01.40 ACUTE NON-RECURRENT PANSINUSITIS: Status: RESOLVED | Noted: 2022-03-30 | Resolved: 2023-09-22

## 2023-09-22 PROCEDURE — 99396 PREV VISIT EST AGE 40-64: CPT | Performed by: FAMILY MEDICINE

## 2023-09-22 RX ORDER — SILDENAFIL 50 MG/1
50 TABLET, FILM COATED ORAL DAILY PRN
Qty: 30 TABLET | Refills: 1 | Status: SHIPPED | OUTPATIENT
Start: 2023-09-22

## 2023-09-22 RX ORDER — ALBUTEROL SULFATE 90 UG/1
2 AEROSOL, METERED RESPIRATORY (INHALATION) EVERY 4 HOURS PRN
Qty: 8.5 G | Refills: 1 | Status: SHIPPED | OUTPATIENT
Start: 2023-09-22

## 2023-09-22 RX ORDER — FLUTICASONE FUROATE AND VILANTEROL 100; 25 UG/1; UG/1
1 POWDER RESPIRATORY (INHALATION)
Qty: 60 EACH | Refills: 1 | Status: SHIPPED | OUTPATIENT
Start: 2023-09-22

## 2023-09-22 RX ORDER — DULOXETIN HYDROCHLORIDE 60 MG/1
60 CAPSULE, DELAYED RELEASE ORAL DAILY
Qty: 90 CAPSULE | Refills: 3 | Status: SHIPPED | OUTPATIENT
Start: 2023-09-22

## 2023-09-22 RX ORDER — AMOXICILLIN AND CLAVULANATE POTASSIUM 875; 125 MG/1; MG/1
1 TABLET, FILM COATED ORAL 2 TIMES DAILY
Qty: 20 TABLET | Refills: 0 | Status: SHIPPED | OUTPATIENT
Start: 2023-09-22

## 2023-09-22 NOTE — ASSESSMENT & PLAN NOTE
Uncontrolled on last labs. Patient will continue atorvastatin. We will obtain an updated lipid panel.

## 2023-09-22 NOTE — ASSESSMENT & PLAN NOTE
Discussed with patient routine health maintenance including: Vaccines, dental/eye health, healthy diet and exercise, colorectal cancer screening. Mental health has been addressed today as well. Routine labs have been ordered. Cologuard has been ordered.

## 2023-09-22 NOTE — PROGRESS NOTES
Satish Funes is a 47 y.o. male.    Chief Complaint   Patient presents with    Annual Exam       HPI     Satish Funes is a 47-year-old male who presents today for an annual physical exam. He is also following up on hyperlipidemia.    The patient denies any significant changes or complaints at this time. He is up to date with his dental exam. He is due for an eye exam. According to recent lab results, his A1c has not changed, and his cholesterol is elevated. His testosterone measured within normal limits. He has not tried Viagra in the past, but is interested is doing so. He denies any known family history of colon cancer. He has not had any previous colonoscopy.     Patient has had hyperlipidemia for few years. He has been compliant with low fat diet. He has not been compliant with taking the medications regularly. Patient reportedly forgets to take his medication for 2 nights per week. He is active, and frequently exercises.    Patient has a history of environmental allergies. He complains of a headache for the past 4 days. He notes increased fatigue for the past 7 days. He has tried Zyrtec-D and Nasacort nasal spray with temporary relief. The patient states that his inhaler has significantly improved symptoms of cough. He denies any dyspnea or wheezing. He recently completed allergy testing in 02/2023 or 03/2023 without any notable results.    The patient states his depression has been stable on Cymbalta.      The following portions of the patient's history were reviewed and updated as appropriate: allergies, current medications, past family history, past medical history, past social history, past surgical history and problem list.     Allergies   Allergen Reactions    Doxycycline Rash         Current Outpatient Medications:     albuterol sulfate  (90 Base) MCG/ACT inhaler, Inhale 2 puffs Every 4 (Four) Hours As Needed for Wheezing., Disp: 8.5 g, Rfl: 1    atorvastatin (Lipitor) 20 MG tablet, Take 1  "tablet by mouth Daily., Disp: 90 tablet, Rfl: 3    DULoxetine (CYMBALTA) 60 MG capsule, Take 1 capsule by mouth Daily., Disp: 90 capsule, Rfl: 3    Fluticasone Furoate-Vilanterol (Breo Ellipta) 100-25 MCG/ACT aerosol powder , Inhale 1 puff Daily., Disp: 60 each, Rfl: 1    gabapentin (NEURONTIN) 300 MG capsule, Take 1 capsule by mouth 3 (Three) Times a Day., Disp: , Rfl:     omeprazole (priLOSEC) 20 MG capsule, Take 1 capsule by mouth Daily., Disp: 90 capsule, Rfl: 3    amoxicillin-clavulanate (AUGMENTIN) 875-125 MG per tablet, Take 1 tablet by mouth 2 (Two) Times a Day., Disp: 20 tablet, Rfl: 0    sildenafil (VIAGRA) 50 MG tablet, Take 1 tablet by mouth Daily As Needed for Erectile Dysfunction., Disp: 30 tablet, Rfl: 1    ROS    Review of Systems   Constitutional:  Positive for fatigue. Negative for chills and fever.   HENT:  Positive for congestion. Negative for postnasal drip and sore throat.    Eyes:  Negative for blurred vision and visual disturbance.   Respiratory:  Negative for cough, shortness of breath and wheezing.    Cardiovascular:  Negative for chest pain and leg swelling.   Gastrointestinal:  Negative for abdominal pain, constipation, diarrhea, nausea and vomiting.   Endocrine: Negative for cold intolerance and heat intolerance.   Genitourinary:  Negative for dysuria and frequency.   Musculoskeletal:  Negative for arthralgias and back pain.   Skin:  Negative for color change and rash.   Allergic/Immunologic: Positive for environmental allergies.   Neurological:  Positive for headache. Negative for weakness and numbness.   Hematological:  Does not bruise/bleed easily.   Psychiatric/Behavioral:  Negative for depressed mood. The patient is not nervous/anxious.        Vitals:    09/22/23 1325   BP: 122/82   BP Location: Left arm   Patient Position: Sitting   Cuff Size: Adult   Pulse: 74   Temp: 98 °F (36.7 °C)   SpO2: 98%   Weight: 103 kg (226 lb)   Height: 185.4 cm (72.99\")   PainSc: 0-No pain     Body " mass index is 29.83 kg/m².    Physical Exam     Physical Exam  Constitutional:       General: He is not in acute distress.     Appearance: Normal appearance. He is well-developed.   HENT:      Head: Normocephalic and atraumatic.      Right Ear: External ear normal. A middle ear effusion is present.      Left Ear: External ear normal. A middle ear effusion is present.      Mouth/Throat:      Pharynx: Posterior oropharyngeal erythema present.   Eyes:      Extraocular Movements: Extraocular movements intact.      Conjunctiva/sclera: Conjunctivae normal.      Pupils: Pupils are equal, round, and reactive to light.   Cardiovascular:      Rate and Rhythm: Normal rate and regular rhythm.      Heart sounds: No murmur heard.  Pulmonary:      Effort: Pulmonary effort is normal. No respiratory distress.      Breath sounds: Normal breath sounds. No wheezing.   Abdominal:      General: Bowel sounds are normal. There is no distension.      Palpations: Abdomen is soft.      Tenderness: There is no abdominal tenderness.   Musculoskeletal:      Cervical back: Normal range of motion and neck supple.      Right lower leg: No edema.      Left lower leg: No edema.   Lymphadenopathy:      Cervical: No cervical adenopathy.   Skin:     General: Skin is warm and dry.   Neurological:      Mental Status: He is alert and oriented to person, place, and time.      Cranial Nerves: No cranial nerve deficit.      Deep Tendon Reflexes: Reflexes normal.   Psychiatric:         Mood and Affect: Mood normal.         Behavior: Behavior normal.         Assessment/Plan    Diagnoses and all orders for this visit:    1. Well adult exam (Primary)  Assessment & Plan:  Discussed with patient routine health maintenance including: Vaccines, dental/eye health, healthy diet and exercise, colorectal cancer screening. Mental health has been addressed today as well. Routine labs have been ordered. Cologuard has been ordered.      2. Mixed hyperlipidemia  Assessment &  Plan:  Uncontrolled on last labs. Patient will continue atorvastatin. We will obtain an updated lipid panel.    Orders:  -     Lipid Panel    3. Major depressive disorder, remission status unspecified, unspecified whether recurrent  Assessment & Plan:  Stable on current medication. Patient will continue Cymbalta.       4. Encounter for colorectal cancer screening  -     Cologuard - Stool, Per Rectum; Future    5. Screening for blood disease  -     CBC & Differential    6. Screening for diabetes mellitus    7. Hyperglycemia  -     Comprehensive Metabolic Panel  -     Hemoglobin A1c    8. Nasal congestion  Assessment & Plan:  Patient will continue current allergy medications including antihistamine and Nasacort. He has been provided with an antibiotic today to take if needed.      9. Erectile dysfunction, unspecified erectile dysfunction type  Assessment & Plan:  He has been provided with a prescription for Viagra.       Other orders  -     albuterol sulfate  (90 Base) MCG/ACT inhaler; Inhale 2 puffs Every 4 (Four) Hours As Needed for Wheezing.  Dispense: 8.5 g; Refill: 1  -     Fluticasone Furoate-Vilanterol (Breo Ellipta) 100-25 MCG/ACT aerosol powder ; Inhale 1 puff Daily.  Dispense: 60 each; Refill: 1  -     DULoxetine (CYMBALTA) 60 MG capsule; Take 1 capsule by mouth Daily.  Dispense: 90 capsule; Refill: 3  -     sildenafil (VIAGRA) 50 MG tablet; Take 1 tablet by mouth Daily As Needed for Erectile Dysfunction.  Dispense: 30 tablet; Refill: 1  -     amoxicillin-clavulanate (AUGMENTIN) 875-125 MG per tablet; Take 1 tablet by mouth 2 (Two) Times a Day.  Dispense: 20 tablet; Refill: 0        New Medications Ordered This Visit   Medications    albuterol sulfate  (90 Base) MCG/ACT inhaler     Sig: Inhale 2 puffs Every 4 (Four) Hours As Needed for Wheezing.     Dispense:  8.5 g     Refill:  1    Fluticasone Furoate-Vilanterol (Breo Ellipta) 100-25 MCG/ACT aerosol powder      Sig: Inhale 1 puff Daily.      Dispense:  60 each     Refill:  1    DULoxetine (CYMBALTA) 60 MG capsule     Sig: Take 1 capsule by mouth Daily.     Dispense:  90 capsule     Refill:  3    sildenafil (VIAGRA) 50 MG tablet     Sig: Take 1 tablet by mouth Daily As Needed for Erectile Dysfunction.     Dispense:  30 tablet     Refill:  1    amoxicillin-clavulanate (AUGMENTIN) 875-125 MG per tablet     Sig: Take 1 tablet by mouth 2 (Two) Times a Day.     Dispense:  20 tablet     Refill:  0       No orders of the defined types were placed in this encounter.      Return in about 6 months (around 3/22/2024) for HPL.    Yovana Sorensen DO      Transcribed from ambient dictation for Yovana Sorensen DO by Cynthia Allison.  09/22/23   16:29 EDT    Patient or patient representative verbalized consent to the visit recording.  I have personally performed the services described in this document as transcribed by the above individual, and it is both accurate and complete.  Yovana Sorensen DO  10/8/2023  20:24 EDT

## 2023-09-22 NOTE — ASSESSMENT & PLAN NOTE
Patient will continue current allergy medications including antihistamine and Nasacort. He has been provided with an antibiotic today to take if needed.

## 2023-10-08 PROBLEM — N52.9 ERECTILE DYSFUNCTION: Status: ACTIVE | Noted: 2023-10-08

## 2024-02-05 RX ORDER — DULOXETIN HYDROCHLORIDE 60 MG/1
60 CAPSULE, DELAYED RELEASE ORAL DAILY
Qty: 90 CAPSULE | Refills: 3 | Status: SHIPPED | OUTPATIENT
Start: 2024-02-05

## 2024-02-05 RX ORDER — ATORVASTATIN CALCIUM 20 MG/1
20 TABLET, FILM COATED ORAL DAILY
Qty: 90 TABLET | Refills: 3 | Status: SHIPPED | OUTPATIENT
Start: 2024-02-05

## 2024-02-05 NOTE — TELEPHONE ENCOUNTER
Caller: Shantel Tristanharpreet Nogueira    Relationship: Self    Best call back number: [phone]     Requested Prescriptions:   Requested Prescriptions     Pending Prescriptions Disp Refills    atorvastatin (Lipitor) 20 MG tablet 90 tablet 3     Sig: Take 1 tablet by mouth Daily.    DULoxetine (CYMBALTA) 60 MG capsule 90 capsule 3     Sig: Take 1 capsule by mouth Daily.    \  Pharmacy where request should be sent: [id number] [place] [address] [address] AT [facility] [address] - [phone]  - [phone]     Last office visit with prescribing clinician: 9/22/2023   Last telemedicine visit with prescribing clinician: Visit date not found   Next office visit with prescribing clinician: 3/22/2024     Additional details provided by patient: PATIENT IS COMPLETELY OUT     Does the patient have less than a 3 day supply:  [x] Yes  [] No    Would you like a call back once the refill request has been completed: [] Yes [x] No    If the office needs to give you a call back, can they leave a voicemail: [] Yes [x] No    Tal Post Rep   02/05/24 12:38 EST

## 2024-03-12 NOTE — PROGRESS NOTES
Darnell Lozano is a 42 year old male presenting for   Chief Complaint   Patient presents with    Neurologic Problem     Seizure disorder     Denies Latex allergy or sensitivity.    Medication verified and med list updated  Refills needed today: No  PCP Maria Luz Tan MD    Concerns: Consultation    Patient would like communication of their results via:      Cell Phone:   Telephone Information:   Mobile [phone]     Okay to leave a message containing results? Yes  Vitals:    03/12/24 0839   BP: 122/66   Pulse: 96   Resp: 16      You have chosen to receive care through a telehealth visit.  Do you consent to use a video/audio connection for your medical care today? Yes  People present in this video visit:  Satish Funes (Patient)  HARSHIL Baldwin MD

## 2024-05-14 ENCOUNTER — TELEPHONE (OUTPATIENT)
Dept: INTERNAL MEDICINE | Facility: CLINIC | Age: 49
End: 2024-05-14
Payer: COMMERCIAL

## 2024-05-14 RX ORDER — FLUTICASONE FUROATE AND VILANTEROL 100; 25 UG/1; UG/1
1 POWDER RESPIRATORY (INHALATION)
Qty: 60 EACH | Refills: 1 | Status: SHIPPED | OUTPATIENT
Start: 2024-05-14

## 2024-05-14 NOTE — TELEPHONE ENCOUNTER
Spoke with pharmacy, they no longer keep Breo in the store but can normally get it the next day. Patient needed new rx. Rx sent as previously prescribed.

## 2024-05-14 NOTE — TELEPHONE ENCOUNTER
Caller: Satish Funes    Relationship: Self    Best call back number: [phone]     What medication are you requesting: NEW INHALER     What are your current symptoms: UNABLE TO GET Fluticasone Furoate-Vilanterol (Breo Ellipta) 100-25 MCG/ACT aerosol powder  PHARMACY OUT OF STOCK    Have you had these symptoms before:    [x] Yes  [] No    Have you been treated for these symptoms before:   [x] Yes  [] No    If a prescription is needed, what is your preferred pharmacy and phone number: [facility] #[id number] [place], [place] - [facility] [address] AT [facility] [address] - [phone]  - [phone]      Additional notes:

## 2024-10-08 ENCOUNTER — TELEPHONE (OUTPATIENT)
Dept: INTERNAL MEDICINE | Facility: CLINIC | Age: 49
End: 2024-10-08
Payer: COMMERCIAL

## 2024-10-08 DIAGNOSIS — E78.2 MIXED HYPERLIPIDEMIA: Primary | ICD-10-CM

## 2024-10-08 DIAGNOSIS — Z13.0 SCREENING FOR BLOOD DISEASE: ICD-10-CM

## 2024-10-08 DIAGNOSIS — R73.9 HYPERGLYCEMIA: ICD-10-CM

## 2024-10-08 DIAGNOSIS — Z13.29 SCREENING FOR ENDOCRINE DISORDER: ICD-10-CM

## 2024-10-08 DIAGNOSIS — Z13.1 SCREENING FOR DIABETES MELLITUS: ICD-10-CM

## 2024-10-08 NOTE — TELEPHONE ENCOUNTER
Patient notified of labs reordered. I also let him know that his up coming appointment will be a physical.

## 2024-10-08 NOTE — TELEPHONE ENCOUNTER
Patient presented to office to do lab orders but discovered they were . Patient is requesting new orders be placed.

## 2024-10-08 NOTE — TELEPHONE ENCOUNTER
Would you like to re order the labs or do you want patient to wait until his up coming appointment?

## 2024-10-10 ENCOUNTER — OFFICE VISIT (OUTPATIENT)
Dept: INTERNAL MEDICINE | Facility: CLINIC | Age: 49
End: 2024-10-10
Payer: COMMERCIAL

## 2024-10-10 VITALS
WEIGHT: 230 LBS | HEIGHT: 73 IN | OXYGEN SATURATION: 100 % | HEART RATE: 78 BPM | DIASTOLIC BLOOD PRESSURE: 90 MMHG | TEMPERATURE: 98.3 F | BODY MASS INDEX: 30.48 KG/M2 | SYSTOLIC BLOOD PRESSURE: 130 MMHG

## 2024-10-10 DIAGNOSIS — Z00.00 WELL ADULT EXAM: Primary | ICD-10-CM

## 2024-10-10 DIAGNOSIS — Z12.11 ENCOUNTER FOR COLORECTAL CANCER SCREENING: ICD-10-CM

## 2024-10-10 DIAGNOSIS — R07.9 CHEST PAIN, UNSPECIFIED TYPE: ICD-10-CM

## 2024-10-10 DIAGNOSIS — J45.30 MILD PERSISTENT EXTRINSIC ASTHMA WITHOUT COMPLICATION: ICD-10-CM

## 2024-10-10 DIAGNOSIS — K21.9 GASTROESOPHAGEAL REFLUX DISEASE, UNSPECIFIED WHETHER ESOPHAGITIS PRESENT: ICD-10-CM

## 2024-10-10 DIAGNOSIS — Z12.12 ENCOUNTER FOR COLORECTAL CANCER SCREENING: ICD-10-CM

## 2024-10-10 DIAGNOSIS — E78.2 MIXED HYPERLIPIDEMIA: ICD-10-CM

## 2024-10-10 DIAGNOSIS — F32.9 MAJOR DEPRESSIVE DISORDER, REMISSION STATUS UNSPECIFIED, UNSPECIFIED WHETHER RECURRENT: ICD-10-CM

## 2024-10-10 LAB
ALBUMIN SERPL-MCNC: 4.4 G/DL (ref 3.5–5.2)
ALBUMIN/GLOB SERPL: 1.8 G/DL
ALP SERPL-CCNC: 123 U/L (ref 39–117)
ALT SERPL-CCNC: 16 U/L (ref 1–41)
AST SERPL-CCNC: 21 U/L (ref 1–40)
BASOPHILS # BLD AUTO: 0.04 10*3/MM3 (ref 0–0.2)
BASOPHILS NFR BLD AUTO: 0.6 % (ref 0–1.5)
BILIRUB SERPL-MCNC: 0.6 MG/DL (ref 0–1.2)
BUN SERPL-MCNC: 14 MG/DL (ref 6–20)
BUN/CREAT SERPL: 14 (ref 7–25)
CALCIUM SERPL-MCNC: 9.9 MG/DL (ref 8.6–10.5)
CHLORIDE SERPL-SCNC: 103 MMOL/L (ref 98–107)
CHOLEST SERPL-MCNC: 272 MG/DL (ref 0–200)
CO2 SERPL-SCNC: 29.1 MMOL/L (ref 22–29)
CREAT SERPL-MCNC: 1 MG/DL (ref 0.76–1.27)
EGFRCR SERPLBLD CKD-EPI 2021: 92.8 ML/MIN/1.73
EOSINOPHIL # BLD AUTO: 0.21 10*3/MM3 (ref 0–0.4)
EOSINOPHIL NFR BLD AUTO: 3.1 % (ref 0.3–6.2)
ERYTHROCYTE [DISTWIDTH] IN BLOOD BY AUTOMATED COUNT: 12.3 % (ref 12.3–15.4)
GLOBULIN SER CALC-MCNC: 2.5 GM/DL
GLUCOSE SERPL-MCNC: 109 MG/DL (ref 65–99)
HBA1C MFR BLD: 5.4 % (ref 4.8–5.6)
HCT VFR BLD AUTO: 46.9 % (ref 37.5–51)
HDLC SERPL-MCNC: 53 MG/DL (ref 40–60)
HGB BLD-MCNC: 16 G/DL (ref 13–17.7)
IMM GRANULOCYTES # BLD AUTO: 0.03 10*3/MM3 (ref 0–0.05)
IMM GRANULOCYTES NFR BLD AUTO: 0.4 % (ref 0–0.5)
LDLC SERPL CALC-MCNC: 199 MG/DL (ref 0–100)
LYMPHOCYTES # BLD AUTO: 1.67 10*3/MM3 (ref 0.7–3.1)
LYMPHOCYTES NFR BLD AUTO: 25 % (ref 19.6–45.3)
MCH RBC QN AUTO: 30.9 PG (ref 26.6–33)
MCHC RBC AUTO-ENTMCNC: 34.1 G/DL (ref 31.5–35.7)
MCV RBC AUTO: 90.5 FL (ref 79–97)
MONOCYTES # BLD AUTO: 0.64 10*3/MM3 (ref 0.1–0.9)
MONOCYTES NFR BLD AUTO: 9.6 % (ref 5–12)
NEUTROPHILS # BLD AUTO: 4.1 10*3/MM3 (ref 1.7–7)
NEUTROPHILS NFR BLD AUTO: 61.3 % (ref 42.7–76)
NRBC BLD AUTO-RTO: 0 /100 WBC (ref 0–0.2)
PLATELET # BLD AUTO: 296 10*3/MM3 (ref 140–450)
POTASSIUM SERPL-SCNC: 5.2 MMOL/L (ref 3.5–5.2)
PROT SERPL-MCNC: 6.9 G/DL (ref 6–8.5)
RBC # BLD AUTO: 5.18 10*6/MM3 (ref 4.14–5.8)
SODIUM SERPL-SCNC: 140 MMOL/L (ref 136–145)
TRIGL SERPL-MCNC: 114 MG/DL (ref 0–150)
VLDLC SERPL CALC-MCNC: 20 MG/DL (ref 5–40)
WBC # BLD AUTO: 6.69 10*3/MM3 (ref 3.4–10.8)

## 2024-10-10 RX ORDER — BUDESONIDE AND FORMOTEROL FUMARATE DIHYDRATE 160; 4.5 UG/1; UG/1
2 AEROSOL RESPIRATORY (INHALATION)
Qty: 10.2 G | Refills: 12 | Status: SHIPPED | OUTPATIENT
Start: 2024-10-10

## 2024-10-10 RX ORDER — OMEPRAZOLE 40 MG/1
40 CAPSULE, DELAYED RELEASE ORAL DAILY
Qty: 90 CAPSULE | Refills: 3 | Status: SHIPPED | OUTPATIENT
Start: 2024-10-10

## 2024-10-10 NOTE — PROGRESS NOTES
Satish Funes is a 48 y.o. male.    Chief Complaint   Patient presents with    Annual Exam       HPI   History of Present Illness  The patient presents today to follow up on medication refills and for an annual physical exam.    He has been experiencing discomfort since Sunday, characterized by chest pain that radiates to his arm. He also reports a constant stiffness in his neck and occasional pain on the right side of his chest. Despite taking Prilosec, he continues to experience reflux and heartburn, although these symptoms are not severe. His last heart check-up was 4 to 5 years ago, which included a treadmill stress test.    He is currently out of his cholesterol medication.    He has been taking breo, but would like to try something less expensive.  He reports a persistent feeling of congestion but no shortness of breath or wheezing. He is due for a CT scan of his sinus cavities.     He admits to not maintaining a healthy diet or regular exercise routine.He is not up to date on dental and eye exams. He has not yet received the ColNiniteuard box for colorectal cancer screening. He reports no gastrointestinal issues such as nausea, vomiting, diarrhea, or constipation. He feels tired but has no abnormal body temperature changes. There are no issues with urination, unusual body aches or pains, or skin issues such as rashes or bruises.    He experiences headaches due to allergies and has noticed an increase in blood pressure when taking Zyrtec-D. Nasal sprays exacerbate his headaches, and Flonase causes him to cough. He has tried Claritin, Singulair, and Zyrtec-D, with the latter being the most effective. He finds nasal lavage beneficial.    He continues to take Cymbalta and reports no current anxiety or depression issues.      IMMUNIZATIONS  He has received influenza vaccine today. He has received 3 COVID-19 vaccines. His tetanus is up to date.    The following portions of the patient's history were reviewed and  updated as appropriate: allergies, current medications, past family history, past medical history, past social history, past surgical history and problem list.     Past Medical History:   Diagnosis Date    Chronic left shoulder pain 6/5/2019    Depression     Fractures     GERD (gastroesophageal reflux disease)     Hyperlipidemia     Joint pain 6/5/2019    Kidney stone     Mood disorder 4/20/2018    Other fatigue 4/20/2018       Past Surgical History:   Procedure Laterality Date    ORIF WRIST FRACTURE Right 2010    plate and 7 pins placed       Family History   Problem Relation Age of Onset    Arthritis Mother     Diabetes Mother     Hypertension Mother     Hyperlipidemia Mother     Obesity Mother     Stroke Mother     Heart disease Mother 53    Heart attack Father 62    Hypertension Father     Liver disease Father     COPD Father     Heart disease Father     Heart attack Maternal Aunt     Hypertension Maternal Aunt     Hyperlipidemia Maternal Aunt     Heart disease Maternal Aunt     COPD Maternal Aunt     No Known Problems Maternal Uncle     No Known Problems Paternal Aunt     No Known Problems Paternal Uncle     Stroke Maternal Grandmother     No Known Problems Paternal Grandmother     Heart attack Paternal Grandfather     Heart attack Maternal Aunt     Heart attack Maternal Uncle     Heart disease Maternal Uncle     Hyperlipidemia Maternal Uncle     Hypertension Maternal Uncle     COPD Maternal Uncle     No Known Problems Paternal Aunt     Aneurysm Paternal Uncle        Social History     Socioeconomic History    Marital status: Single   Tobacco Use    Smoking status: Never    Smokeless tobacco: Never   Vaping Use    Vaping status: Never Used   Substance and Sexual Activity    Alcohol use: Not Currently     Comment: social     Drug use: No    Sexual activity: Defer       Allergies   Allergen Reactions    Doxycycline Rash         Current Outpatient Medications:     albuterol sulfate  (90 Base) MCG/ACT  "inhaler, Inhale 2 puffs Every 4 (Four) Hours As Needed for Wheezing., Disp: 8.5 g, Rfl: 1    atorvastatin (Lipitor) 20 MG tablet, Take 1 tablet by mouth Daily., Disp: 90 tablet, Rfl: 3    DULoxetine (CYMBALTA) 60 MG capsule, Take 1 capsule by mouth Daily., Disp: 90 capsule, Rfl: 3    omeprazole (priLOSEC) 40 MG capsule, Take 1 capsule by mouth Daily., Disp: 90 capsule, Rfl: 3    budesonide-formoterol (Symbicort) 160-4.5 MCG/ACT inhaler, Inhale 2 puffs 2 (Two) Times a Day., Disp: 10.2 g, Rfl: 12    sildenafil (VIAGRA) 50 MG tablet, Take 1 tablet by mouth Daily As Needed for Erectile Dysfunction. (Patient not taking: Reported on 10/10/2024), Disp: 30 tablet, Rfl: 1    tiZANidine (ZANAFLEX) 4 MG tablet, Take 1 tablet by mouth 3 times a day. (Patient not taking: Reported on 10/10/2024), Disp: , Rfl:     ROS    Review of Systems   Constitutional:  Negative for chills, fatigue and fever.   HENT:  Positive for congestion.    Eyes:  Negative for visual disturbance.   Respiratory:  Positive for shortness of breath. Negative for cough.    Cardiovascular:  Positive for chest pain.   Gastrointestinal:  Negative for abdominal pain, constipation, diarrhea, nausea and vomiting.   Endocrine: Negative for cold intolerance and heat intolerance.   Genitourinary:  Negative for difficulty urinating.   Musculoskeletal:  Positive for arthralgias.   Skin:  Negative for rash.   Allergic/Immunologic: Negative for environmental allergies.   Neurological:  Negative for weakness and headache.   Hematological:  Does not bruise/bleed easily.   Psychiatric/Behavioral:  Negative for depressed mood. The patient is not nervous/anxious.        Vitals:    10/10/24 0915   BP: 130/90   Pulse: 78   Temp: 98.3 °F (36.8 °C)   SpO2: 100%   Weight: 104 kg (230 lb)   Height: 185.4 cm (73\")     Body mass index is 30.34 kg/m².    Physical Exam     Physical Exam  Constitutional:       General: He is not in acute distress.     Appearance: Normal appearance. He is " well-developed.   HENT:      Head: Normocephalic and atraumatic.      Right Ear: Tympanic membrane and external ear normal.      Left Ear: Tympanic membrane and external ear normal.      Mouth/Throat:      Pharynx: No posterior oropharyngeal erythema.   Eyes:      Extraocular Movements: Extraocular movements intact.      Conjunctiva/sclera: Conjunctivae normal.      Pupils: Pupils are equal, round, and reactive to light.   Cardiovascular:      Rate and Rhythm: Normal rate and regular rhythm.      Heart sounds: No murmur heard.  Pulmonary:      Effort: Pulmonary effort is normal. No respiratory distress.      Breath sounds: Normal breath sounds. No wheezing.   Abdominal:      General: Bowel sounds are normal. There is no distension.      Palpations: Abdomen is soft.      Tenderness: There is no abdominal tenderness.   Musculoskeletal:      Cervical back: Neck supple.      Right lower leg: No edema.      Left lower leg: No edema.   Lymphadenopathy:      Cervical: No cervical adenopathy.   Skin:     General: Skin is warm and dry.   Neurological:      Mental Status: He is alert and oriented to person, place, and time.      Cranial Nerves: No cranial nerve deficit.      Deep Tendon Reflexes: Reflexes normal.   Psychiatric:         Mood and Affect: Mood normal.         Behavior: Behavior normal.           ECG 12 Lead    Date/Time: 10/10/2024 12:35 PM  Performed by: Yovana Soernsen DO    Authorized by: Yovana Sorensen DO  Comparison: not compared with previous ECG   Rhythm: sinus rhythm  Rate: normal  Conduction: conduction normal  ST Segments: ST segments normal  T Waves: T waves normal  QRS axis: normal  Other: no other findings    Clinical impression: normal ECG            Diagnoses and all orders for this visit:    1. Well adult exam (Primary)    2. Mixed hyperlipidemia    3. Major depressive disorder, remission status unspecified, unspecified whether recurrent    4. Gastroesophageal reflux disease,  unspecified whether esophagitis present    5. Mild persistent extrinsic asthma without complication    6. Chest pain, unspecified type    7. Encounter for colorectal cancer screening  -     Cologuard - Stool, Per Rectum; Future    Other orders  -     omeprazole (priLOSEC) 40 MG capsule; Take 1 capsule by mouth Daily.  Dispense: 90 capsule; Refill: 3  -     budesonide-formoterol (Symbicort) 160-4.5 MCG/ACT inhaler; Inhale 2 puffs 2 (Two) Times a Day.  Dispense: 10.2 g; Refill: 12  -     Fluzone >6mos ([id number])  -     ECG 12 Lead        Assessment & Plan  1. Well adult exam.  Routine health maintenance, including vaccines, dental/eye health, healthy diet, and exercise, was discussed. Mental health and colon cancer screening also addressed. Routine labs have been ordered. A flu vaccine has been administered.    2. Hyperlipidemia.  Continuation of atorvastatin is advised. He is instructed to call [facility] to refill his prescription.    3. Depression.  Stability on current medication was noted. Continuation of Cymbalta is recommended.    4. Gastroesophageal Reflux Disease (GERD).  Prilosec will be increased to 40 mg due to ongoing symptoms of reflux and heartburn.    5. Allergy induced asthma.  Breo will be changed to Symbicort due to cost considerations. Albuterol may be continued as needed. Rescheduling an appointment with Dr. Jauregui for ENT is encouraged.    6. Chest pain.  An EKG was performed and was normal.       New Medications Ordered This Visit   Medications    omeprazole (priLOSEC) 40 MG capsule     Sig: Take 1 capsule by mouth Daily.     Dispense:  90 capsule     Refill:  3    budesonide-formoterol (Symbicort) 160-4.5 MCG/ACT inhaler     Sig: Inhale 2 puffs 2 (Two) Times a Day.     Dispense:  10.2 g     Refill:  12     Fill generic       Orders Placed This Encounter   Procedures    Fluzone >6mos ([id number])       Return in about 1 year (around 10/10/2025) for Annual.      Yovana Sorensen DO

## 2025-02-19 RX ORDER — DULOXETIN HYDROCHLORIDE 60 MG/1
60 CAPSULE, DELAYED RELEASE ORAL DAILY
Qty: 90 CAPSULE | Refills: 3 | Status: SHIPPED | OUTPATIENT
Start: 2025-02-19

## 2025-03-14 ENCOUNTER — APPOINTMENT (OUTPATIENT)
Dept: GENERAL RADIOLOGY | Facility: HOSPITAL | Age: 50
End: 2025-03-14
Payer: COMMERCIAL

## 2025-03-14 ENCOUNTER — APPOINTMENT (OUTPATIENT)
Dept: CT IMAGING | Facility: HOSPITAL | Age: 50
End: 2025-03-14
Payer: COMMERCIAL

## 2025-03-14 ENCOUNTER — HOSPITAL ENCOUNTER (EMERGENCY)
Facility: HOSPITAL | Age: 50
Discharge: HOME | End: 2025-03-14
Attending: EMERGENCY MEDICINE
Payer: COMMERCIAL

## 2025-03-14 VITALS
TEMPERATURE: 97.8 F | OXYGEN SATURATION: 100 % | HEIGHT: 73 IN | HEART RATE: 70 BPM | DIASTOLIC BLOOD PRESSURE: 86 MMHG | SYSTOLIC BLOOD PRESSURE: 130 MMHG | BODY MASS INDEX: 29.16 KG/M2 | RESPIRATION RATE: 17 BRPM | WEIGHT: 220 LBS
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
## 2025-03-14 DIAGNOSIS — S06.0X1A CONCUSSION WITH LOSS OF CONSCIOUSNESS OF 30 MINUTES OR LESS, INITIAL ENCOUNTER: ICD-10-CM

## 2025-03-14 DIAGNOSIS — S00.83XA CONTUSION OF FOREHEAD, INITIAL ENCOUNTER: ICD-10-CM

## 2025-03-14 DIAGNOSIS — S13.9XXA NECK SPRAIN, INITIAL ENCOUNTER: ICD-10-CM

## 2025-03-14 DIAGNOSIS — S09.90XA CLOSED HEAD INJURY, INITIAL ENCOUNTER: Primary | ICD-10-CM

## 2025-03-14 DIAGNOSIS — S01.81XA LACERATION OF FOREHEAD, INITIAL ENCOUNTER: ICD-10-CM

## 2025-03-14 PROCEDURE — 70450 CT HEAD/BRAIN W/O DYE: CPT

## 2025-03-14 PROCEDURE — 72040 X-RAY EXAM NECK SPINE 2-3 VW: CPT

## 2025-03-14 PROCEDURE — 99284 EMERGENCY DEPT VISIT MOD MDM: CPT
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
## 2025-03-14 NOTE — DISCHARGE INSTRUCTIONS
Observe concussion precautions.  Avoid repeat head injury.  Take caution with strenuous activity over this weekend, or any work around dangerous equipment or machinery.

## 2025-06-02 RX ORDER — ATORVASTATIN CALCIUM 20 MG/1
20 TABLET, FILM COATED ORAL DAILY
Qty: 90 TABLET | Refills: 3 | Status: SHIPPED | OUTPATIENT
Start: 2025-06-02

## 2025-06-02 NOTE — TELEPHONE ENCOUNTER
Rx Refill Note  Requested Prescriptions     Pending Prescriptions Disp Refills    atorvastatin (LIPITOR) 20 MG tablet [Pharmacy Med Name: ATORVASTATIN 20MG TABLETS] 90 tablet 3     Sig: TAKE 1 TABLET BY MOUTH DAILY      Last office visit with prescribing clinician: 10/10/2024   Last telemedicine visit with prescribing clinician: Visit date not found   Next office visit with prescribing clinician: Visit date not found.    Return in about 1 year (around 10/10/2025) for Annual.       Grisel Salazar MA  06/02/25, 12:12 EDT